# Patient Record
Sex: FEMALE | Race: WHITE | NOT HISPANIC OR LATINO | URBAN - METROPOLITAN AREA
[De-identification: names, ages, dates, MRNs, and addresses within clinical notes are randomized per-mention and may not be internally consistent; named-entity substitution may affect disease eponyms.]

---

## 2019-07-14 ENCOUNTER — EMERGENCY (EMERGENCY)
Facility: HOSPITAL | Age: 39
LOS: 0 days | Discharge: HOME | End: 2019-07-14
Attending: EMERGENCY MEDICINE | Admitting: EMERGENCY MEDICINE
Payer: COMMERCIAL

## 2019-07-14 VITALS
RESPIRATION RATE: 18 BRPM | TEMPERATURE: 98 F | HEART RATE: 80 BPM | SYSTOLIC BLOOD PRESSURE: 103 MMHG | DIASTOLIC BLOOD PRESSURE: 64 MMHG | OXYGEN SATURATION: 100 %

## 2019-07-14 VITALS
RESPIRATION RATE: 18 BRPM | OXYGEN SATURATION: 100 % | DIASTOLIC BLOOD PRESSURE: 56 MMHG | HEART RATE: 100 BPM | TEMPERATURE: 96 F | SYSTOLIC BLOOD PRESSURE: 114 MMHG

## 2019-07-14 DIAGNOSIS — R53.81 OTHER MALAISE: ICD-10-CM

## 2019-07-14 DIAGNOSIS — E86.0 DEHYDRATION: ICD-10-CM

## 2019-07-14 DIAGNOSIS — R11.2 NAUSEA WITH VOMITING, UNSPECIFIED: ICD-10-CM

## 2019-07-14 DIAGNOSIS — M54.9 DORSALGIA, UNSPECIFIED: ICD-10-CM

## 2019-07-14 DIAGNOSIS — R53.1 WEAKNESS: ICD-10-CM

## 2019-07-14 DIAGNOSIS — Z88.8 ALLERGY STATUS TO OTHER DRUGS, MEDICAMENTS AND BIOLOGICAL SUBSTANCES: ICD-10-CM

## 2019-07-14 LAB
ALBUMIN SERPL ELPH-MCNC: 3.8 G/DL — SIGNIFICANT CHANGE UP (ref 3.5–5.2)
ALP SERPL-CCNC: 51 U/L — SIGNIFICANT CHANGE UP (ref 30–115)
ALT FLD-CCNC: 12 U/L — SIGNIFICANT CHANGE UP (ref 0–41)
ANION GAP SERPL CALC-SCNC: 13 MMOL/L — SIGNIFICANT CHANGE UP (ref 7–14)
APPEARANCE UR: CLEAR — SIGNIFICANT CHANGE UP
AST SERPL-CCNC: 20 U/L — SIGNIFICANT CHANGE UP (ref 0–41)
BACTERIA # UR AUTO: ABNORMAL
BASOPHILS # BLD AUTO: 0.05 K/UL — SIGNIFICANT CHANGE UP (ref 0–0.2)
BASOPHILS NFR BLD AUTO: 0.5 % — SIGNIFICANT CHANGE UP (ref 0–1)
BILIRUB SERPL-MCNC: 0.7 MG/DL — SIGNIFICANT CHANGE UP (ref 0.2–1.2)
BILIRUB UR-MCNC: NEGATIVE — SIGNIFICANT CHANGE UP
BUN SERPL-MCNC: 10 MG/DL — SIGNIFICANT CHANGE UP (ref 10–20)
CALCIUM SERPL-MCNC: 9.8 MG/DL — SIGNIFICANT CHANGE UP (ref 8.5–10.1)
CHLORIDE SERPL-SCNC: 89 MMOL/L — LOW (ref 98–110)
CO2 SERPL-SCNC: 24 MMOL/L — SIGNIFICANT CHANGE UP (ref 17–32)
COLOR SPEC: YELLOW — SIGNIFICANT CHANGE UP
CREAT SERPL-MCNC: 0.7 MG/DL — SIGNIFICANT CHANGE UP (ref 0.7–1.5)
DIFF PNL FLD: NEGATIVE — SIGNIFICANT CHANGE UP
EOSINOPHIL # BLD AUTO: 0.26 K/UL — SIGNIFICANT CHANGE UP (ref 0–0.7)
EOSINOPHIL NFR BLD AUTO: 2.8 % — SIGNIFICANT CHANGE UP (ref 0–8)
EPI CELLS # UR: 4 /HPF — SIGNIFICANT CHANGE UP (ref 0–5)
GLUCOSE SERPL-MCNC: 89 MG/DL — SIGNIFICANT CHANGE UP (ref 70–99)
GLUCOSE UR QL: NEGATIVE — SIGNIFICANT CHANGE UP
HCG SERPL QL: NEGATIVE — SIGNIFICANT CHANGE UP
HCT VFR BLD CALC: 43.1 % — SIGNIFICANT CHANGE UP (ref 37–47)
HGB BLD-MCNC: 14.2 G/DL — SIGNIFICANT CHANGE UP (ref 12–16)
HYALINE CASTS # UR AUTO: NEGATIVE /LPF — SIGNIFICANT CHANGE UP (ref 0–7)
IMM GRANULOCYTES NFR BLD AUTO: 0.2 % — SIGNIFICANT CHANGE UP (ref 0.1–0.3)
KETONES UR-MCNC: NEGATIVE — SIGNIFICANT CHANGE UP
LACTATE SERPL-SCNC: 2 MMOL/L — SIGNIFICANT CHANGE UP (ref 0.5–2.2)
LEUKOCYTE ESTERASE UR-ACNC: ABNORMAL
LIDOCAIN IGE QN: 36 U/L — SIGNIFICANT CHANGE UP (ref 7–60)
LYMPHOCYTES # BLD AUTO: 3.79 K/UL — HIGH (ref 1.2–3.4)
LYMPHOCYTES # BLD AUTO: 40.2 % — SIGNIFICANT CHANGE UP (ref 20.5–51.1)
MAGNESIUM SERPL-MCNC: 1.6 MG/DL — LOW (ref 1.8–2.4)
MCHC RBC-ENTMCNC: 27.4 PG — SIGNIFICANT CHANGE UP (ref 27–31)
MCHC RBC-ENTMCNC: 32.9 G/DL — SIGNIFICANT CHANGE UP (ref 32–37)
MCV RBC AUTO: 83.2 FL — SIGNIFICANT CHANGE UP (ref 81–99)
MONOCYTES # BLD AUTO: 1.05 K/UL — HIGH (ref 0.1–0.6)
MONOCYTES NFR BLD AUTO: 11.1 % — HIGH (ref 1.7–9.3)
NEUTROPHILS # BLD AUTO: 4.26 K/UL — SIGNIFICANT CHANGE UP (ref 1.4–6.5)
NEUTROPHILS NFR BLD AUTO: 45.2 % — SIGNIFICANT CHANGE UP (ref 42.2–75.2)
NITRITE UR-MCNC: NEGATIVE — SIGNIFICANT CHANGE UP
NRBC # BLD: 0 /100 WBCS — SIGNIFICANT CHANGE UP (ref 0–0)
PH UR: 6 — SIGNIFICANT CHANGE UP (ref 5–8)
PLATELET # BLD AUTO: 318 K/UL — SIGNIFICANT CHANGE UP (ref 130–400)
POTASSIUM SERPL-MCNC: 4.9 MMOL/L — SIGNIFICANT CHANGE UP (ref 3.5–5)
POTASSIUM SERPL-SCNC: 4.9 MMOL/L — SIGNIFICANT CHANGE UP (ref 3.5–5)
PROT SERPL-MCNC: 6.8 G/DL — SIGNIFICANT CHANGE UP (ref 6–8)
PROT UR-MCNC: NEGATIVE — SIGNIFICANT CHANGE UP
RBC # BLD: 5.18 M/UL — SIGNIFICANT CHANGE UP (ref 4.2–5.4)
RBC # FLD: 12.9 % — SIGNIFICANT CHANGE UP (ref 11.5–14.5)
RBC CASTS # UR COMP ASSIST: 1 /HPF — SIGNIFICANT CHANGE UP (ref 0–4)
SODIUM SERPL-SCNC: 126 MMOL/L — LOW (ref 135–146)
SP GR SPEC: 1.01 — SIGNIFICANT CHANGE UP (ref 1.01–1.02)
UROBILINOGEN FLD QL: SIGNIFICANT CHANGE UP
WBC # BLD: 9.43 K/UL — SIGNIFICANT CHANGE UP (ref 4.8–10.8)
WBC # FLD AUTO: 9.43 K/UL — SIGNIFICANT CHANGE UP (ref 4.8–10.8)
WBC UR QL: 7 /HPF — HIGH (ref 0–5)

## 2019-07-14 PROCEDURE — 99284 EMERGENCY DEPT VISIT MOD MDM: CPT

## 2019-07-14 PROCEDURE — 93010 ELECTROCARDIOGRAM REPORT: CPT

## 2019-07-14 RX ORDER — SODIUM CHLORIDE 9 MG/ML
1000 INJECTION INTRAMUSCULAR; INTRAVENOUS; SUBCUTANEOUS ONCE
Refills: 0 | Status: COMPLETED | OUTPATIENT
Start: 2019-07-14 | End: 2019-07-14

## 2019-07-14 RX ORDER — ACETAMINOPHEN 500 MG
650 TABLET ORAL ONCE
Refills: 0 | Status: COMPLETED | OUTPATIENT
Start: 2019-07-14 | End: 2019-07-14

## 2019-07-14 RX ORDER — ONDANSETRON 8 MG/1
4 TABLET, FILM COATED ORAL ONCE
Refills: 0 | Status: COMPLETED | OUTPATIENT
Start: 2019-07-14 | End: 2019-07-14

## 2019-07-14 RX ORDER — MAGNESIUM SULFATE 500 MG/ML
2 VIAL (ML) INJECTION ONCE
Refills: 0 | Status: DISCONTINUED | OUTPATIENT
Start: 2019-07-14 | End: 2019-07-14

## 2019-07-14 RX ORDER — SODIUM CHLORIDE 9 MG/ML
1000 INJECTION, SOLUTION INTRAVENOUS ONCE
Refills: 0 | Status: COMPLETED | OUTPATIENT
Start: 2019-07-14 | End: 2019-07-14

## 2019-07-14 RX ADMIN — SODIUM CHLORIDE 1000 MILLILITER(S): 9 INJECTION INTRAMUSCULAR; INTRAVENOUS; SUBCUTANEOUS at 19:05

## 2019-07-14 RX ADMIN — SODIUM CHLORIDE 1000 MILLILITER(S): 9 INJECTION, SOLUTION INTRAVENOUS at 18:11

## 2019-07-14 RX ADMIN — SODIUM CHLORIDE 1000 MILLILITER(S): 9 INJECTION, SOLUTION INTRAVENOUS at 17:11

## 2019-07-14 RX ADMIN — SODIUM CHLORIDE 1000 MILLILITER(S): 9 INJECTION INTRAMUSCULAR; INTRAVENOUS; SUBCUTANEOUS at 20:46

## 2019-07-14 RX ADMIN — Medication 650 MILLIGRAM(S): at 20:45

## 2019-07-14 NOTE — ED ADULT NURSE REASSESSMENT NOTE - NS ED NURSE REASSESS COMMENT FT1
patient observed resting in bed. c/o mild headache. VS reassessed, VS WNL. plan of care reviewed with patient, awaiting disposition.

## 2019-07-14 NOTE — ED PROVIDER NOTE - CARE PLAN
Principal Discharge DX:	Nausea & vomiting  Secondary Diagnosis:	Malaise  Secondary Diagnosis:	Back pain

## 2019-07-14 NOTE — ED PROVIDER NOTE - NSFOLLOWUPINSTRUCTIONS_ED_ALL_ED_FT
Please follow up with your PMD and gastroenterologist as outpatient.    Back Pain    Back pain is very common in adults. The cause of back pain is rarely dangerous and the pain often gets better over time. The cause of your back pain may not be known and may include strain of muscles or ligaments, degeneration of the spinal disks, or arthritis. Occasionally the pain may radiate down your leg(s). Over-the-counter medicines to reduce pain and inflammation are often the most helpful. Stretching and remaining active frequently helps the healing process.     SEEK IMMEDIATE MEDICAL CARE IF YOU HAVE ANY OF THE FOLLOWING SYMPTOMS: bowel or bladder control problems, unusual weakness or numbness in your arms or legs, nausea or vomiting, abdominal pain, fever, dizziness/lightheadedness.    Nausea / Vomiting    Nausea is the feeling that you have to vomit. As nausea gets worse, it can lead to vomiting. Vomiting puts you at an increased risk for dehydration. Older adults and people with other diseases or a weak immune system are at higher risk for dehydration. Drink clear fluids in small but frequent amounts as tolerated. Eat bland, easy-to-digest foods in small amounts as tolerated.    SEEK IMMEDIATE MEDICAL CARE IF YOU HAVE ANY OF THE FOLLOWING SYMPTOMS: fever, inability to keep sufficient fluids down, black or bloody vomitus, black or bloody stools, lightheadedness/dizziness, chest pain, severe headache, rash, shortness of breath, cold or clammy skin, confusion, pain with urination, or any signs of dehydration.

## 2019-07-14 NOTE — ED PROVIDER NOTE - CLINICAL SUMMARY MEDICAL DECISION MAKING FREE TEXT BOX
patient evaluated for non specific back pain, weakness, lab work nml, after ivf patient feels better.

## 2019-07-14 NOTE — ED ADULT TRIAGE NOTE - CHIEF COMPLAINT QUOTE
Patient has pain in lower back and legs x 2 weeks. Since Thursday having "loose stool". Patient states symptoms got worse over the past week since Wednesday when she started her menstrual cycle

## 2019-07-14 NOTE — ED ADULT NURSE NOTE - OBJECTIVE STATEMENT
Patient c/o leg pain and cramping after walking up 1 flight of stairs starting last week. Patient also feels lethargic and had 1 episode of NBNB vomiting on Thursday. Patient also having loose stool NBNB. On assessment no obvious signs of distress noted.

## 2019-07-14 NOTE — ED PROVIDER NOTE - NS ED ROS FT
Constitutional:  (+) malaise (-) fever (-) chills   Eyes: (-) visual changes   ENMT: (-) nasal or chest congestion (-) runny nose (-) sore throat (-) hoarseness (-) neck pain (-) neck stiffness  Cardiac: (-) chest pain (-) syncope  Respiratory: (-) cough (-) SOB  GI: (-) abdominal pain (-) nausea (-) vomiting (-) diarrhea (-) abdominal pain (-) hematochezia (-) changes in appetite (-) hx of nephrolithiasis  : (-) dysuria (-) increased frequency  (-) hematuria   MS: (-) back pain  Neuro: (+) weakness (-) headache (-) dizziness (-) numbness/tingling to extremities B/L   Skin: (-) rash   Except as documented in the HPI, all other systems are negative.

## 2019-07-14 NOTE — ED PROVIDER NOTE - OBJECTIVE STATEMENT
39 yo vegan female with PMH of migraines presents to the ED with multiple complaints. Patient c/o weakness and feeling dehydrated x 2 weeks.  Patient states that she started her menstrual cycle last week and had couple episodes of NBNB vomiting and loose stools last Thursday that passed on Friday. Patient unsure if symptoms are related to her period or due to dehydration.  Denies fever, chills, chest pain, SOB, nausea, dysuria, hematuria, abnormal vaginal bleeding or discharge, rash, syncope, bloody stools, hx of anemia, or recent travel.

## 2019-07-14 NOTE — ED PROVIDER NOTE - ATTENDING CONTRIBUTION TO CARE
non specific symtpoms of weakness, irregular bowel movements and feeling dehydrated for 2 days that she relates to her menstrual period. no fevers, no body aches, no chest pain, abd pain. on exam well appearing mmm, lungs and heart nml, abd soft, no cva tendnerss, plan is to check basic labs and ivf.

## 2019-07-14 NOTE — ED PROVIDER NOTE - PHYSICAL EXAMINATION
GENERAL: Well-nourished, Well-developed. NAD.  Eyes: PERRLA, EOMI. Pink conjunctiva B/L. No asymmetry. No nystagmus. No conjunctival injection. Non-icteric sclera.  ENMT: MMM. No pharyngeal erythema or exudates. Uvula midline. No oral lesions.  Neck: FROM  CVS:  Normal S1,S2. No murmurs appreciated on auscultation   RESP: No use of accessory muscles. Chest rise symmetrical with good expansion. Lungs clear to auscultation B/L. No wheezing, rales, or rhonchi auscultated.  GI: Normal auscultation of bowel sounds in all 4 quadrants. Soft, Nontender, Nondistended. No guarding. No CVAT B/L.  MSK: No visible signs of trauma such as ecchymosis, erythema, or swelling. FROM of upper and lower extremities B/L.   Skin: Warm, Dry. No rashes or lesions. Good cap refill < 2 sec B/L.  EXT: Radial and pedal pulses present B/L. No calf tenderness or swelling B/L.   Neuro: AA&O x 3. Mini mental status exam Intact. CNs II-XII grossly intact. Speaking in full sentences. No slurring of speech. No facial droop. No tremors. Sensation grossly intact. Strength 5/5 B/L. Gait within normal limits. Normal Finger to nose exam.   Psych:  Cooperative. Anxious

## 2019-07-14 NOTE — ED PROVIDER NOTE - CARE PROVIDER_API CALL
YOUR PMD,   Phone: (   )    -  Fax: (   )    -  Follow Up Time:     Sumeet Johnson)  Gastroenterology; Internal Medicine  [address] [place] [address]  Phone: [phone]  Fax: [phone]  Follow Up Time:

## 2019-07-15 LAB
CULTURE RESULTS: SIGNIFICANT CHANGE UP
SPECIMEN SOURCE: SIGNIFICANT CHANGE UP

## 2019-12-23 ENCOUNTER — EMERGENCY (EMERGENCY)
Facility: HOSPITAL | Age: 39
LOS: 0 days | Discharge: HOME | End: 2019-12-23
Attending: EMERGENCY MEDICINE | Admitting: EMERGENCY MEDICINE
Payer: COMMERCIAL

## 2019-12-23 VITALS
WEIGHT: 147.93 LBS | SYSTOLIC BLOOD PRESSURE: 98 MMHG | DIASTOLIC BLOOD PRESSURE: 65 MMHG | OXYGEN SATURATION: 99 % | TEMPERATURE: 98 F | RESPIRATION RATE: 18 BRPM | HEART RATE: 109 BPM

## 2019-12-23 VITALS — HEART RATE: 96 BPM | SYSTOLIC BLOOD PRESSURE: 86 MMHG | DIASTOLIC BLOOD PRESSURE: 56 MMHG

## 2019-12-23 DIAGNOSIS — R19.5 OTHER FECAL ABNORMALITIES: ICD-10-CM

## 2019-12-23 DIAGNOSIS — Z88.8 ALLERGY STATUS TO OTHER DRUGS, MEDICAMENTS AND BIOLOGICAL SUBSTANCES STATUS: ICD-10-CM

## 2019-12-23 DIAGNOSIS — R10.9 UNSPECIFIED ABDOMINAL PAIN: ICD-10-CM

## 2019-12-23 DIAGNOSIS — R11.10 VOMITING, UNSPECIFIED: ICD-10-CM

## 2019-12-23 PROBLEM — Z78.9 OTHER SPECIFIED HEALTH STATUS: Chronic | Status: ACTIVE | Noted: 2019-07-22

## 2019-12-23 LAB
ALBUMIN SERPL ELPH-MCNC: 4.1 G/DL — SIGNIFICANT CHANGE UP (ref 3.5–5.2)
ALP SERPL-CCNC: 49 U/L — SIGNIFICANT CHANGE UP (ref 30–115)
ALT FLD-CCNC: 12 U/L — SIGNIFICANT CHANGE UP (ref 0–41)
ANION GAP SERPL CALC-SCNC: 15 MMOL/L — HIGH (ref 7–14)
APPEARANCE UR: CLEAR — SIGNIFICANT CHANGE UP
AST SERPL-CCNC: 19 U/L — SIGNIFICANT CHANGE UP (ref 0–41)
BASOPHILS # BLD AUTO: 0.08 K/UL — SIGNIFICANT CHANGE UP (ref 0–0.2)
BASOPHILS NFR BLD AUTO: 0.7 % — SIGNIFICANT CHANGE UP (ref 0–1)
BILIRUB DIRECT SERPL-MCNC: 0.2 MG/DL — SIGNIFICANT CHANGE UP (ref 0–0.2)
BILIRUB INDIRECT FLD-MCNC: 0.7 MG/DL — SIGNIFICANT CHANGE UP (ref 0.2–1.2)
BILIRUB SERPL-MCNC: 0.9 MG/DL — SIGNIFICANT CHANGE UP (ref 0.2–1.2)
BILIRUB UR-MCNC: NEGATIVE — SIGNIFICANT CHANGE UP
BUN SERPL-MCNC: 15 MG/DL — SIGNIFICANT CHANGE UP (ref 10–20)
CALCIUM SERPL-MCNC: 9.9 MG/DL — SIGNIFICANT CHANGE UP (ref 8.5–10.1)
CHLORIDE SERPL-SCNC: 92 MMOL/L — LOW (ref 98–110)
CO2 SERPL-SCNC: 21 MMOL/L — SIGNIFICANT CHANGE UP (ref 17–32)
COLOR SPEC: YELLOW — SIGNIFICANT CHANGE UP
CREAT SERPL-MCNC: 0.8 MG/DL — SIGNIFICANT CHANGE UP (ref 0.7–1.5)
DIFF PNL FLD: NEGATIVE — SIGNIFICANT CHANGE UP
EOSINOPHIL # BLD AUTO: 0.42 K/UL — SIGNIFICANT CHANGE UP (ref 0–0.7)
EOSINOPHIL NFR BLD AUTO: 3.5 % — SIGNIFICANT CHANGE UP (ref 0–8)
GLUCOSE SERPL-MCNC: 93 MG/DL — SIGNIFICANT CHANGE UP (ref 70–99)
GLUCOSE UR QL: NEGATIVE — SIGNIFICANT CHANGE UP
HCT VFR BLD CALC: 43 % — SIGNIFICANT CHANGE UP (ref 37–47)
HGB BLD-MCNC: 13.9 G/DL — SIGNIFICANT CHANGE UP (ref 12–16)
IMM GRANULOCYTES NFR BLD AUTO: 0.2 % — SIGNIFICANT CHANGE UP (ref 0.1–0.3)
KETONES UR-MCNC: ABNORMAL
LACTATE SERPL-SCNC: 0.7 MMOL/L — SIGNIFICANT CHANGE UP (ref 0.7–2)
LEUKOCYTE ESTERASE UR-ACNC: NEGATIVE — SIGNIFICANT CHANGE UP
LIDOCAIN IGE QN: 40 U/L — SIGNIFICANT CHANGE UP (ref 7–60)
LYMPHOCYTES # BLD AUTO: 37.5 % — SIGNIFICANT CHANGE UP (ref 20.5–51.1)
LYMPHOCYTES # BLD AUTO: 4.5 K/UL — HIGH (ref 1.2–3.4)
MCHC RBC-ENTMCNC: 27 PG — SIGNIFICANT CHANGE UP (ref 27–31)
MCHC RBC-ENTMCNC: 32.3 G/DL — SIGNIFICANT CHANGE UP (ref 32–37)
MCV RBC AUTO: 83.5 FL — SIGNIFICANT CHANGE UP (ref 81–99)
MONOCYTES # BLD AUTO: 1.19 K/UL — HIGH (ref 0.1–0.6)
MONOCYTES NFR BLD AUTO: 9.9 % — HIGH (ref 1.7–9.3)
NEUTROPHILS # BLD AUTO: 5.79 K/UL — SIGNIFICANT CHANGE UP (ref 1.4–6.5)
NEUTROPHILS NFR BLD AUTO: 48.2 % — SIGNIFICANT CHANGE UP (ref 42.2–75.2)
NITRITE UR-MCNC: NEGATIVE — SIGNIFICANT CHANGE UP
NRBC # BLD: 0 /100 WBCS — SIGNIFICANT CHANGE UP (ref 0–0)
PH UR: 5.5 — SIGNIFICANT CHANGE UP (ref 5–8)
PLATELET # BLD AUTO: 308 K/UL — SIGNIFICANT CHANGE UP (ref 130–400)
POTASSIUM SERPL-MCNC: 4.6 MMOL/L — SIGNIFICANT CHANGE UP (ref 3.5–5)
POTASSIUM SERPL-SCNC: 4.6 MMOL/L — SIGNIFICANT CHANGE UP (ref 3.5–5)
PROT SERPL-MCNC: 6.8 G/DL — SIGNIFICANT CHANGE UP (ref 6–8)
PROT UR-MCNC: SIGNIFICANT CHANGE UP
RBC # BLD: 5.15 M/UL — SIGNIFICANT CHANGE UP (ref 4.2–5.4)
RBC # FLD: 13.6 % — SIGNIFICANT CHANGE UP (ref 11.5–14.5)
SODIUM SERPL-SCNC: 128 MMOL/L — LOW (ref 135–146)
SP GR SPEC: 1.02 — SIGNIFICANT CHANGE UP (ref 1.01–1.02)
UROBILINOGEN FLD QL: SIGNIFICANT CHANGE UP
WBC # BLD: 12.01 K/UL — HIGH (ref 4.8–10.8)
WBC # FLD AUTO: 12.01 K/UL — HIGH (ref 4.8–10.8)

## 2019-12-23 PROCEDURE — 99284 EMERGENCY DEPT VISIT MOD MDM: CPT

## 2019-12-23 NOTE — ED PROVIDER NOTE - CARE PROVIDER_API CALL
Sumeet Johnson)  Gastroenterology; Internal Medicine  [address] [place] [address]  Phone: [phone]  Fax: [phone]  Follow Up Time: 1-3 Days

## 2019-12-23 NOTE — ED ADULT NURSE NOTE - OBJECTIVE STATEMENT
pt 39 y/o female presents to Ed c/o light yellow stool as per pt she always has yellow stool but today was lighter, pt states she is nauseous but more so because of being nervous

## 2019-12-23 NOTE — ED PROVIDER NOTE - PHYSICAL EXAMINATION
Physical Exam    Vital Signs: I have reviewed the initial vital signs  Constitutional: well-nourished, appears stated age, no acute distress  EENT: Conjunctiva pink, Sclera clear, PERRLA, EOMI. Mucous membranes moist, no exudates or lesions noted, uvula midline.  Cardiovascular: S1 and S2 present, regular rate, regular rhythm  Respiratory: unlabored respiratory effort, clear to auscultation bilaterally no wheezing, rales or rhonchi  Gastrointestinal: soft, non-tender abdomen. No guarding or rebound tenderness.   Musculoskeletal: supple nontender neck, no midline tenderness, no joint pain  Integumentary: warm, dry, no rash  Psychiatric: appropriate mood, appropriate affect

## 2019-12-23 NOTE — ED PROVIDER NOTE - OBJECTIVE STATEMENT
39 year old female no past medical history presenting with pale stools. Patient states she had pale stools, yellow in color this morning. She states she had some honey and then vomited once NBNB. No medical problems, no hx of abd sx. Denies fevers, chills, headache, dizziness, chest pain, cough, shortness of breath, abdominal pain, nausea, diarrhea, dysuria. No recent travel.

## 2019-12-23 NOTE — ED ADULT TRIAGE NOTE - CHIEF COMPLAINT QUOTE
pt c.o abdominal pain with light yellow stool with excessive gas pt c.o abdominal pain with light yellow stool with excessive gas, pt states is very anxious about being here

## 2019-12-23 NOTE — ED PROVIDER NOTE - PATIENT PORTAL LINK FT
You can access the FollowMyHealth Patient Portal offered by [facility] by registering at the following website: [email or web address]. By joining [employer]’s FollowMyHealth portal, you will also be able to view your health information using other applications (apps) compatible with our system.

## 2019-12-23 NOTE — ED ADULT NURSE NOTE - CHIEF COMPLAINT QUOTE
pt c.o abdominal pain with light yellow stool with excessive gas, pt states is very anxious about being here

## 2019-12-23 NOTE — ED PROVIDER NOTE - NS ED ROS FT
Review of Systems         Constitutional: (-) fever (-) chills (-) weakness       EENT: (-) sore throat (-) congestion       Cardiovascular: (-) chest pain (-) syncope       Respiratory: (-) cough, (-) shortness of breath       Gastrointestinal: (-) abdominal pain (-) vomiting (-) diarrhea (-) nausea (-) constipation       Genitourinary: (-) dysuria       Musculoskeletal: (-) neck pain (-) back pain (-) joint pain       Integumentary: (-) rash       Neurological: (-) headache (-) altered mental status (-) dizziness       Psych: (-) psych history

## 2019-12-23 NOTE — ED PROVIDER NOTE - ATTENDING CONTRIBUTION TO CARE
38 y/o female denies sig PMH / PSH, takes OTC iron and Mg supplements, now presents with abnormal stool color x several weeks where stool was pale, then noticed today that stool turned yellow, sx are persistent, denies modifying factors, + abdominal bloating / gas, not recently hospitalized, not institutionalized or in day program, denies blood in stool, black stool, unintentional weight loss, abdominal pain, fever, headache, myalgias, n/v, recent antibiotic use, recent travel or sick contacts, anorexia, urinary sx, vaginal d/c or other associated complaints at present.    VSS, awake, alert, non-toxic appearing, lying comfortably in stretcher, in NAD, ears clear, no scleral icterus, oropharynx clear, mmm, no JVD or bruit, no jaundice, skin rash or lesions, chest CTAB, non-labored breathing, no w/r/r, +S1/S2, RRR, no m/r/g, abdomen soft, NT, ND, +BS, no scars, hernias or distention, no pulsatile masses or bruits appreciated, no CVA tenderness, no peripheral edema or deformities, alert, clear speech and steady gait.    Labs WNL, VSS, pt is well appearing, rec outpt f/u for further evaluation and management. The patient was given detailed return precautions and advised to return to the emergency department if any new symptoms developed, symptoms worsened or for any concerns. The patient was offered the opportunity to ask questions and verbalized that they understand the diagnosis and discharge instructions.

## 2019-12-24 LAB
CULTURE RESULTS: SIGNIFICANT CHANGE UP
SPECIMEN SOURCE: SIGNIFICANT CHANGE UP

## 2020-04-21 PROBLEM — Z00.00 ENCOUNTER FOR PREVENTIVE HEALTH EXAMINATION: Status: ACTIVE | Noted: 2020-04-21

## 2020-07-28 ENCOUNTER — APPOINTMENT (OUTPATIENT)
Dept: OBGYN | Facility: CLINIC | Age: 40
End: 2020-07-28

## 2021-01-04 ENCOUNTER — EMERGENCY (EMERGENCY)
Facility: HOSPITAL | Age: 41
LOS: 0 days | Discharge: LEFT BEFORE DISCHARGE | End: 2021-01-04
Attending: EMERGENCY MEDICINE | Admitting: EMERGENCY MEDICINE
Payer: COMMERCIAL

## 2021-01-04 VITALS
RESPIRATION RATE: 17 BRPM | DIASTOLIC BLOOD PRESSURE: 52 MMHG | SYSTOLIC BLOOD PRESSURE: 96 MMHG | HEART RATE: 100 BPM | OXYGEN SATURATION: 100 %

## 2021-01-04 VITALS
DIASTOLIC BLOOD PRESSURE: 58 MMHG | WEIGHT: 125 LBS | SYSTOLIC BLOOD PRESSURE: 85 MMHG | RESPIRATION RATE: 18 BRPM | HEART RATE: 120 BPM | OXYGEN SATURATION: 100 %

## 2021-01-04 DIAGNOSIS — Z88.8 ALLERGY STATUS TO OTHER DRUGS, MEDICAMENTS AND BIOLOGICAL SUBSTANCES STATUS: ICD-10-CM

## 2021-01-04 DIAGNOSIS — R51.9 HEADACHE, UNSPECIFIED: ICD-10-CM

## 2021-01-04 DIAGNOSIS — I95.9 HYPOTENSION, UNSPECIFIED: ICD-10-CM

## 2021-01-04 LAB
ALBUMIN SERPL ELPH-MCNC: 3.7 G/DL — SIGNIFICANT CHANGE UP (ref 3.5–5.2)
ALP SERPL-CCNC: 60 U/L — SIGNIFICANT CHANGE UP (ref 30–115)
ALT FLD-CCNC: 9 U/L — SIGNIFICANT CHANGE UP (ref 0–41)
ANION GAP SERPL CALC-SCNC: 12 MMOL/L — SIGNIFICANT CHANGE UP (ref 7–14)
AST SERPL-CCNC: 33 U/L — SIGNIFICANT CHANGE UP (ref 0–41)
BASOPHILS # BLD AUTO: 0.05 K/UL — SIGNIFICANT CHANGE UP (ref 0–0.2)
BASOPHILS NFR BLD AUTO: 0.4 % — SIGNIFICANT CHANGE UP (ref 0–1)
BILIRUB SERPL-MCNC: 0.6 MG/DL — SIGNIFICANT CHANGE UP (ref 0.2–1.2)
BUN SERPL-MCNC: 18 MG/DL — SIGNIFICANT CHANGE UP (ref 10–20)
CALCIUM SERPL-MCNC: 9.5 MG/DL — SIGNIFICANT CHANGE UP (ref 8.5–10.1)
CHLORIDE SERPL-SCNC: 100 MMOL/L — SIGNIFICANT CHANGE UP (ref 98–110)
CO2 SERPL-SCNC: 17 MMOL/L — SIGNIFICANT CHANGE UP (ref 17–32)
CREAT SERPL-MCNC: 0.9 MG/DL — SIGNIFICANT CHANGE UP (ref 0.7–1.5)
EOSINOPHIL # BLD AUTO: 0.47 K/UL — SIGNIFICANT CHANGE UP (ref 0–0.7)
EOSINOPHIL NFR BLD AUTO: 4 % — SIGNIFICANT CHANGE UP (ref 0–8)
GLUCOSE SERPL-MCNC: 84 MG/DL — SIGNIFICANT CHANGE UP (ref 70–99)
HCG SERPL QL: NEGATIVE — SIGNIFICANT CHANGE UP
HCT VFR BLD CALC: 36.5 % — LOW (ref 37–47)
HGB BLD-MCNC: 11.6 G/DL — LOW (ref 12–16)
IMM GRANULOCYTES NFR BLD AUTO: 0.3 % — SIGNIFICANT CHANGE UP (ref 0.1–0.3)
LYMPHOCYTES # BLD AUTO: 48.7 % — SIGNIFICANT CHANGE UP (ref 20.5–51.1)
LYMPHOCYTES # BLD AUTO: 5.68 K/UL — HIGH (ref 1.2–3.4)
MAGNESIUM SERPL-MCNC: 1.8 MG/DL — SIGNIFICANT CHANGE UP (ref 1.8–2.4)
MCHC RBC-ENTMCNC: 27.4 PG — SIGNIFICANT CHANGE UP (ref 27–31)
MCHC RBC-ENTMCNC: 31.8 G/DL — LOW (ref 32–37)
MCV RBC AUTO: 86.1 FL — SIGNIFICANT CHANGE UP (ref 81–99)
MONOCYTES # BLD AUTO: 1.07 K/UL — HIGH (ref 0.1–0.6)
MONOCYTES NFR BLD AUTO: 9.2 % — SIGNIFICANT CHANGE UP (ref 1.7–9.3)
NEUTROPHILS # BLD AUTO: 4.36 K/UL — SIGNIFICANT CHANGE UP (ref 1.4–6.5)
NEUTROPHILS NFR BLD AUTO: 37.4 % — LOW (ref 42.2–75.2)
NRBC # BLD: 0 /100 WBCS — SIGNIFICANT CHANGE UP (ref 0–0)
PLATELET # BLD AUTO: 359 K/UL — SIGNIFICANT CHANGE UP (ref 130–400)
POTASSIUM SERPL-MCNC: 6.4 MMOL/L — CRITICAL HIGH (ref 3.5–5)
POTASSIUM SERPL-SCNC: 6.4 MMOL/L — CRITICAL HIGH (ref 3.5–5)
PROT SERPL-MCNC: 6.6 G/DL — SIGNIFICANT CHANGE UP (ref 6–8)
RBC # BLD: 4.24 M/UL — SIGNIFICANT CHANGE UP (ref 4.2–5.4)
RBC # FLD: 14.3 % — SIGNIFICANT CHANGE UP (ref 11.5–14.5)
SODIUM SERPL-SCNC: 129 MMOL/L — LOW (ref 135–146)
WBC # BLD: 11.66 K/UL — HIGH (ref 4.8–10.8)
WBC # FLD AUTO: 11.66 K/UL — HIGH (ref 4.8–10.8)

## 2021-01-04 PROCEDURE — 99284 EMERGENCY DEPT VISIT MOD MDM: CPT

## 2021-01-04 PROCEDURE — 93010 ELECTROCARDIOGRAM REPORT: CPT

## 2021-01-04 RX ORDER — SODIUM CHLORIDE 9 MG/ML
1000 INJECTION, SOLUTION INTRAVENOUS ONCE
Refills: 0 | Status: COMPLETED | OUTPATIENT
Start: 2021-01-04 | End: 2021-01-04

## 2021-01-04 RX ADMIN — SODIUM CHLORIDE 1000 MILLILITER(S): 9 INJECTION, SOLUTION INTRAVENOUS at 15:41

## 2021-01-04 NOTE — ED PROVIDER NOTE - CLINICAL SUMMARY MEDICAL DECISION MAKING FREE TEXT BOX
39 Y/O F H/O MIGRAINES C/O HEADACHE X 2 DAYS. HEADACHE RESOLVED ON PRESENTATION. NEURO EXAM NONFOCAL. PT HYPOTENSIVE IN TRIAGE. RESOLVED WITH IVFS. EKG REVIEWED. PT SIGNED OUT AMA PRIOR TO LAB RESULTS AND CT HEAD BEING PERFORMED. RISKS EXPLAINED AND PT VERBALIZED UNDERSTANDING.

## 2021-01-04 NOTE — ED PROVIDER NOTE - PATIENT PORTAL LINK FT
You can access the FollowMyHealth Patient Portal offered by [facility] by registering at the following website: [email or web address]. By joining [facility]’s FollowMyHealth portal, you will also be able to view your health information using other applications (apps) compatible with our system.

## 2021-01-04 NOTE — ED PROVIDER NOTE - NS ED ROS FT
General: No fever, chills, or weakness.  Eyes:  No visual changes, eye pain or discharge.  ENMT:  No hearing changes, pain, no sore throat or runny nose, no difficulty swallowing  Cardiac:  No chest pain, SOB or edema. No chest pain with exertion.  Respiratory:  No cough or respiratory distress. No hemoptysis. No history of asthma or RAD.  GI:  No nausea, vomiting, diarrhea or abdominal pain.  :  No dysuria, frequency or burning.  MS: Cold sensation in hands and feet. No myalgia, muscle weakness, joint pain or back pain.  Neuro:  HA. No LOC. No change in ambulation. No dizziness.  Skin:  No skin rash.

## 2021-01-04 NOTE — ED PROVIDER NOTE - PROGRESS NOTE DETAILS
PT REQUESTING D/C. PT UNWILLING TO WAIT FOR LAB RESULTS OR CT HEAD. RISKS OF LEAVING D/W PT. PT VERBALIZED UNDERSTANDING. WILL SIGN OUT AMA.

## 2021-01-04 NOTE — ED PROVIDER NOTE - TRANSFER CONDITION
[facility] transfer to [phone].    ----- Message from Mona Fails, DO sent at 8/4/2017 12:05 AM CDT -----  CBC shows no anemia, CMP shows your kidney function and liver function are normal, Lipids for your cholesterol panel are good and TSH for your thyroid hor
Patient verbalized understanding of VS message.
Stable

## 2021-01-04 NOTE — ED PROVIDER NOTE - NSFOLLOWUPCLINICS_GEN_ALL_ED_FT
[facility]  Medicine  [address], [place]   Phone: [phone]  Fax:   Follow Up Time: 1-3 Days

## 2021-01-04 NOTE — ED PROVIDER NOTE - ATTENDING CONTRIBUTION TO CARE
I personally evaluated the patient. I reviewed the Resident’s or Physician Assistant’s note (as assigned above), and agree with the findings and plan except as documented in my note.   41 Y/O F H/O MIGRAINE HAS, C/O 2 DAYS OF HEADACHE, TYPICALOF HER USUAL MIGRAINES. + PRECEDING AURA. PT REPORTS "ITS GONE NOW BUT I WANTED TO BE CHECKED OUT." N COMPLAINTS CURRENTLY. NO RECENT ILLNESS, FEVER, CHILLS. COUGH. NO CP, SOB. NO ABD PAIN. PT NOTED TO BE HYPOTENSIVE IN TRIAGE AND BROUGHT TO CRITICAL CARE. VITALS NOTED. . ALERT OX3 NAD. NCAT PERRL EOMI. CN 2-12 INTACT NORMAL SPEECH. NO FACIAL DROOP. 5/5 MOTOR STRENGTH B/L UPPER AND B/L LOWER EXT. NO PRONATOR DRIFT. NORMAL FINGER TO NOSE B/L. NO SENSORY DEFICIT. NECK SUPPLE. NO MENINGISMUS. RRR. LUNGS CLEAR B/L. ABD- SOFT NONTENDER. BACK NONTENDER. 2+ RADIAL AND PEDAL PULSES B/L.

## 2021-01-04 NOTE — ED PROVIDER NOTE - OBJECTIVE STATEMENT
40f h/o anxiety p/w HA, cold sensation in hands. HA is diffuse, throbbing, 5/10, non-radiating, not a/w any activity or time of day. Denies fever/chills, cough, sore throat, cp, sob, abd pain, n/v/d. Ambulating w/o difficulty. No weakness in hands or legs.

## 2021-01-04 NOTE — ED ADULT NURSE NOTE - CHPI ED NUR SYMPTOMS NEG
no blurred vision/no change in level of consciousness/no confusion/no fever/no loss of consciousness/no numbness/no vomiting/no weakness

## 2021-01-04 NOTE — ED ADULT NURSE REASSESSMENT NOTE - NS ED NURSE REASSESS COMMENT FT1
patient stating " I need a few minutes im frozen. can you skip me and call someone else". patient educated on the importance of being triaged at this time, patient verbalizes understanding and continue to refuse triage at this time.

## 2021-01-04 NOTE — ED PROVIDER NOTE - PHYSICAL EXAMINATION
Constitutional: Well developed, well nourished. NAD. Good general hygiene  Head: Atraumatic.  Eyes: PERRLA. EOMI without discomfort.   ENT: No nasal discharge. Mucous membranes moist.  Neck: Supple. Painless ROM.  Cardiovascular: Regular rhythm. Regular rate. Normal S1 and S2. No murmurs. 2+ pulses in all extremities.   Pulmonary: Normal respiratory rate and effort. Lungs clear to auscultation bilaterally. No wheezing, rales, or rhonchi. Bilateral, equal lung expansion.   Abdominal: Soft. Nondistended. Nontender. No rebound or guarding.   Extremities. Pelvis stable. No lower extremity edema. Symmetric calves.  Skin: No rashes.   Neuro: AAOx3. CN 2-12, 5/5 strength in all extremities, sensation equal and intact in all extremities, no dysmetria, no pronator drift. Ambulating in ED w/o difficulty.

## 2021-01-04 NOTE — ED ADULT NURSE NOTE - OBJECTIVE STATEMENT
pt presents for asymptomatic hypotension. pt states 3 days ago she had a migraine and this afternoon she woke up with left arm numbness after napping and leaning on her arm. pt denies any pain /numbness currently. denies any chest pain/sob. neuro exam intact. gcs-15.

## 2021-04-27 NOTE — ED ADULT NURSE NOTE - PAIN RATING/NUMBER SCALE (0-10): REST
0 5-Fu Pregnancy And Lactation Text: This medication is Pregnancy Category X and contraindicated in pregnancy and in women who may become pregnant. It is unknown if this medication is excreted in breast milk.

## 2022-01-01 ENCOUNTER — INPATIENT (INPATIENT)
Facility: HOSPITAL | Age: 42
LOS: 1 days | End: 2022-01-08
Attending: INTERNAL MEDICINE | Admitting: INTERNAL MEDICINE
Payer: MEDICAID

## 2022-01-01 VITALS
WEIGHT: 80.03 LBS | DIASTOLIC BLOOD PRESSURE: 58 MMHG | OXYGEN SATURATION: 97 % | SYSTOLIC BLOOD PRESSURE: 135 MMHG | HEART RATE: 91 BPM

## 2022-01-01 VITALS — HEART RATE: 22 BPM

## 2022-01-01 LAB
ALBUMIN SERPL ELPH-MCNC: 1.4 G/DL — LOW (ref 3.5–5.2)
ALBUMIN SERPL ELPH-MCNC: 1.4 G/DL — LOW (ref 3.5–5.2)
ALBUMIN SERPL ELPH-MCNC: 2.1 G/DL — LOW (ref 3.5–5.2)
ALBUMIN SERPL ELPH-MCNC: 2.2 G/DL — LOW (ref 3.5–5.2)
ALBUMIN SERPL ELPH-MCNC: 2.4 G/DL — LOW (ref 3.5–5.2)
ALBUMIN SERPL ELPH-MCNC: 2.4 G/DL — LOW (ref 3.5–5.2)
ALBUMIN SERPL ELPH-MCNC: 2.8 G/DL — LOW (ref 3.5–5.2)
ALP SERPL-CCNC: 34 U/L — SIGNIFICANT CHANGE UP (ref 30–115)
ALP SERPL-CCNC: 45 U/L — SIGNIFICANT CHANGE UP (ref 30–115)
ALP SERPL-CCNC: 46 U/L — SIGNIFICANT CHANGE UP (ref 30–115)
ALP SERPL-CCNC: 49 U/L — SIGNIFICANT CHANGE UP (ref 30–115)
ALP SERPL-CCNC: 51 U/L — SIGNIFICANT CHANGE UP (ref 30–115)
ALP SERPL-CCNC: 52 U/L — SIGNIFICANT CHANGE UP (ref 30–115)
ALP SERPL-CCNC: 62 U/L — SIGNIFICANT CHANGE UP (ref 30–115)
ALT FLD-CCNC: 101 U/L — HIGH (ref 0–41)
ALT FLD-CCNC: 150 U/L — HIGH (ref 0–41)
ALT FLD-CCNC: 155 U/L — HIGH (ref 0–41)
ALT FLD-CCNC: 163 U/L — HIGH (ref 0–41)
ALT FLD-CCNC: 170 U/L — HIGH (ref 0–41)
ALT FLD-CCNC: 180 U/L — HIGH (ref 0–41)
ALT FLD-CCNC: 282 U/L — HIGH (ref 0–41)
AMPHET UR-MCNC: NEGATIVE — SIGNIFICANT CHANGE UP
ANION GAP SERPL CALC-SCNC: 16 MMOL/L — HIGH (ref 7–14)
ANION GAP SERPL CALC-SCNC: 18 MMOL/L — HIGH (ref 7–14)
ANION GAP SERPL CALC-SCNC: 18 MMOL/L — HIGH (ref 7–14)
ANION GAP SERPL CALC-SCNC: 20 MMOL/L — HIGH (ref 7–14)
ANION GAP SERPL CALC-SCNC: 22 MMOL/L — HIGH (ref 7–14)
ANION GAP SERPL CALC-SCNC: 22 MMOL/L — HIGH (ref 7–14)
ANION GAP SERPL CALC-SCNC: 23 MMOL/L — HIGH (ref 7–14)
APAP SERPL-MCNC: <5 UG/ML — LOW (ref 10–30)
APPEARANCE UR: CLEAR — SIGNIFICANT CHANGE UP
APTT BLD: 47.1 SEC — HIGH (ref 27–39.2)
APTT BLD: 61.5 SEC — HIGH (ref 27–39.2)
AST SERPL-CCNC: 250 U/L — HIGH (ref 0–41)
AST SERPL-CCNC: 369 U/L — HIGH (ref 0–41)
AST SERPL-CCNC: 386 U/L — HIGH (ref 0–41)
AST SERPL-CCNC: 401 U/L — HIGH (ref 0–41)
AST SERPL-CCNC: 450 U/L — HIGH (ref 0–41)
AST SERPL-CCNC: 469 U/L — HIGH (ref 0–41)
AST SERPL-CCNC: 765 U/L — HIGH (ref 0–41)
BACTERIA # UR AUTO: ABNORMAL
BARBITURATES UR SCN-MCNC: NEGATIVE — SIGNIFICANT CHANGE UP
BASE EXCESS BLDA CALC-SCNC: -18.3 MMOL/L — LOW (ref -2–3)
BASOPHILS # BLD AUTO: 0.01 K/UL — SIGNIFICANT CHANGE UP (ref 0–0.2)
BASOPHILS # BLD AUTO: 0.03 K/UL — SIGNIFICANT CHANGE UP (ref 0–0.2)
BASOPHILS NFR BLD AUTO: 0.1 % — SIGNIFICANT CHANGE UP (ref 0–1)
BASOPHILS NFR BLD AUTO: 0.1 % — SIGNIFICANT CHANGE UP (ref 0–1)
BENZODIAZ UR-MCNC: POSITIVE
BILIRUB DIRECT SERPL-MCNC: 0.4 MG/DL — HIGH (ref 0–0.3)
BILIRUB INDIRECT FLD-MCNC: 0.2 MG/DL — SIGNIFICANT CHANGE UP (ref 0.2–1.2)
BILIRUB SERPL-MCNC: 0.3 MG/DL — SIGNIFICANT CHANGE UP (ref 0.2–1.2)
BILIRUB SERPL-MCNC: 0.6 MG/DL — SIGNIFICANT CHANGE UP (ref 0.2–1.2)
BILIRUB SERPL-MCNC: 0.6 MG/DL — SIGNIFICANT CHANGE UP (ref 0.2–1.2)
BILIRUB SERPL-MCNC: 0.7 MG/DL — SIGNIFICANT CHANGE UP (ref 0.2–1.2)
BILIRUB SERPL-MCNC: 0.8 MG/DL — SIGNIFICANT CHANGE UP (ref 0.2–1.2)
BILIRUB SERPL-MCNC: 0.9 MG/DL — SIGNIFICANT CHANGE UP (ref 0.2–1.2)
BILIRUB SERPL-MCNC: 1 MG/DL — SIGNIFICANT CHANGE UP (ref 0.2–1.2)
BILIRUB UR-MCNC: NEGATIVE — SIGNIFICANT CHANGE UP
BLD GP AB SCN SERPL QL: SIGNIFICANT CHANGE UP
BUN SERPL-MCNC: 24 MG/DL — HIGH (ref 10–20)
BUN SERPL-MCNC: 25 MG/DL — HIGH (ref 10–20)
BUN SERPL-MCNC: 26 MG/DL — HIGH (ref 10–20)
BUN SERPL-MCNC: 28 MG/DL — HIGH (ref 10–20)
BUN SERPL-MCNC: 29 MG/DL — HIGH (ref 10–20)
CALCIUM SERPL-MCNC: 4.7 MG/DL — CRITICAL LOW (ref 8.5–10.1)
CALCIUM SERPL-MCNC: 4.9 MG/DL — CRITICAL LOW (ref 8.5–10.1)
CALCIUM SERPL-MCNC: 4.9 MG/DL — CRITICAL LOW (ref 8.5–10.1)
CALCIUM SERPL-MCNC: 6 MG/DL — LOW (ref 8.5–10.1)
CALCIUM SERPL-MCNC: 6.5 MG/DL — LOW (ref 8.5–10.1)
CALCIUM SERPL-MCNC: 7.1 MG/DL — LOW (ref 8.5–10.1)
CALCIUM SERPL-MCNC: 8 MG/DL — LOW (ref 8.5–10.1)
CHLORIDE SERPL-SCNC: 100 MMOL/L — SIGNIFICANT CHANGE UP (ref 98–110)
CHLORIDE SERPL-SCNC: 100 MMOL/L — SIGNIFICANT CHANGE UP (ref 98–110)
CHLORIDE SERPL-SCNC: 102 MMOL/L — SIGNIFICANT CHANGE UP (ref 98–110)
CHLORIDE SERPL-SCNC: 97 MMOL/L — LOW (ref 98–110)
CHLORIDE SERPL-SCNC: 98 MMOL/L — SIGNIFICANT CHANGE UP (ref 98–110)
CHOLEST SERPL-MCNC: 48 MG/DL — SIGNIFICANT CHANGE UP
CK SERPL-CCNC: 9125 U/L — HIGH (ref 0–225)
CK SERPL-CCNC: >2000 U/L — HIGH (ref 0–225)
CK SERPL-CCNC: HIGH U/L (ref 0–225)
CK SERPL-CCNC: HIGH U/L (ref 0–225)
CO2 SERPL-SCNC: 10 MMOL/L — LOW (ref 17–32)
CO2 SERPL-SCNC: 17 MMOL/L — SIGNIFICANT CHANGE UP (ref 17–32)
CO2 SERPL-SCNC: 18 MMOL/L — SIGNIFICANT CHANGE UP (ref 17–32)
CO2 SERPL-SCNC: 20 MMOL/L — SIGNIFICANT CHANGE UP (ref 17–32)
CO2 SERPL-SCNC: 6 MMOL/L — CRITICAL LOW (ref 17–32)
CO2 SERPL-SCNC: 9 MMOL/L — CRITICAL LOW (ref 17–32)
CO2 SERPL-SCNC: 9 MMOL/L — CRITICAL LOW (ref 17–32)
COCAINE METAB.OTHER UR-MCNC: NEGATIVE — SIGNIFICANT CHANGE UP
COLOR SPEC: YELLOW — SIGNIFICANT CHANGE UP
COMMENT - URINE: SIGNIFICANT CHANGE UP
CORTIS AM PEAK SERPL-MCNC: 127 UG/DL — HIGH (ref 6–18.4)
CREAT SERPL-MCNC: 1.7 MG/DL — HIGH (ref 0.7–1.5)
CREAT SERPL-MCNC: 1.7 MG/DL — HIGH (ref 0.7–1.5)
CREAT SERPL-MCNC: 1.8 MG/DL — HIGH (ref 0.7–1.5)
CREAT SERPL-MCNC: 1.8 MG/DL — HIGH (ref 0.7–1.5)
CREAT SERPL-MCNC: 2.2 MG/DL — HIGH (ref 0.7–1.5)
CREAT SERPL-MCNC: 2.4 MG/DL — HIGH (ref 0.7–1.5)
CREAT SERPL-MCNC: 2.9 MG/DL — HIGH (ref 0.7–1.5)
DACRYOCYTES BLD QL SMEAR: SLIGHT — SIGNIFICANT CHANGE UP
DIFF PNL FLD: ABNORMAL
DRUG SCREEN 1, URINE RESULT: SIGNIFICANT CHANGE UP
EOSINOPHIL # BLD AUTO: 0.01 K/UL — SIGNIFICANT CHANGE UP (ref 0–0.7)
EOSINOPHIL # BLD AUTO: 0.05 K/UL — SIGNIFICANT CHANGE UP (ref 0–0.7)
EOSINOPHIL NFR BLD AUTO: 0 % — SIGNIFICANT CHANGE UP (ref 0–8)
EOSINOPHIL NFR BLD AUTO: 0.6 % — SIGNIFICANT CHANGE UP (ref 0–8)
EPI CELLS # UR: ABNORMAL /HPF
ETHANOL SERPL-MCNC: <10 MG/DL — SIGNIFICANT CHANGE UP
FOLATE SERPL-MCNC: 11.3 NG/ML — SIGNIFICANT CHANGE UP
GAS PNL BLDA: SIGNIFICANT CHANGE UP
GLUCOSE BLDC GLUCOMTR-MCNC: 103 MG/DL — HIGH (ref 70–99)
GLUCOSE BLDC GLUCOMTR-MCNC: 124 MG/DL — HIGH (ref 70–99)
GLUCOSE BLDC GLUCOMTR-MCNC: 156 MG/DL — HIGH (ref 70–99)
GLUCOSE BLDC GLUCOMTR-MCNC: 164 MG/DL — HIGH (ref 70–99)
GLUCOSE BLDC GLUCOMTR-MCNC: 166 MG/DL — HIGH (ref 70–99)
GLUCOSE BLDC GLUCOMTR-MCNC: 17 MG/DL — CRITICAL LOW (ref 70–99)
GLUCOSE BLDC GLUCOMTR-MCNC: 172 MG/DL — HIGH (ref 70–99)
GLUCOSE BLDC GLUCOMTR-MCNC: 183 MG/DL — HIGH (ref 70–99)
GLUCOSE BLDC GLUCOMTR-MCNC: 21 MG/DL — CRITICAL LOW (ref 70–99)
GLUCOSE BLDC GLUCOMTR-MCNC: 242 MG/DL — HIGH (ref 70–99)
GLUCOSE BLDC GLUCOMTR-MCNC: 28 MG/DL — CRITICAL LOW (ref 70–99)
GLUCOSE BLDC GLUCOMTR-MCNC: 41 MG/DL — CRITICAL LOW (ref 70–99)
GLUCOSE BLDC GLUCOMTR-MCNC: 55 MG/DL — LOW (ref 70–99)
GLUCOSE BLDC GLUCOMTR-MCNC: 58 MG/DL — LOW (ref 70–99)
GLUCOSE BLDC GLUCOMTR-MCNC: 59 MG/DL — LOW (ref 70–99)
GLUCOSE BLDC GLUCOMTR-MCNC: 64 MG/DL — LOW (ref 70–99)
GLUCOSE BLDC GLUCOMTR-MCNC: 67 MG/DL — LOW (ref 70–99)
GLUCOSE BLDC GLUCOMTR-MCNC: 74 MG/DL — SIGNIFICANT CHANGE UP (ref 70–99)
GLUCOSE BLDC GLUCOMTR-MCNC: 76 MG/DL — SIGNIFICANT CHANGE UP (ref 70–99)
GLUCOSE BLDC GLUCOMTR-MCNC: 79 MG/DL — SIGNIFICANT CHANGE UP (ref 70–99)
GLUCOSE BLDC GLUCOMTR-MCNC: 95 MG/DL — SIGNIFICANT CHANGE UP (ref 70–99)
GLUCOSE BLDC GLUCOMTR-MCNC: >600 MG/DL — CRITICAL HIGH (ref 70–99)
GLUCOSE SERPL-MCNC: 195 MG/DL — HIGH (ref 70–99)
GLUCOSE SERPL-MCNC: 202 MG/DL — HIGH (ref 70–99)
GLUCOSE SERPL-MCNC: 260 MG/DL — HIGH (ref 70–99)
GLUCOSE SERPL-MCNC: 284 MG/DL — HIGH (ref 70–99)
GLUCOSE SERPL-MCNC: 319 MG/DL — HIGH (ref 70–99)
GLUCOSE SERPL-MCNC: 651 MG/DL — CRITICAL HIGH (ref 70–99)
GLUCOSE SERPL-MCNC: 93 MG/DL — SIGNIFICANT CHANGE UP (ref 70–99)
GLUCOSE UR QL: 250 MG/DL
GRAN CASTS # UR COMP ASSIST: ABNORMAL /LPF
HCG SERPL QL: NEGATIVE — SIGNIFICANT CHANGE UP
HCO3 BLDA-SCNC: 8 MMOL/L — CRITICAL LOW (ref 21–28)
HCT VFR BLD CALC: 21.7 % — LOW (ref 37–47)
HCT VFR BLD CALC: 33.8 % — LOW (ref 37–47)
HCT VFR BLD CALC: 37 % — SIGNIFICANT CHANGE UP (ref 37–47)
HCT VFR BLD CALC: 37.5 % — SIGNIFICANT CHANGE UP (ref 37–47)
HCT VFR BLD CALC: 37.5 % — SIGNIFICANT CHANGE UP (ref 37–47)
HCYS SERPL-MCNC: 5.7 UMOL/L — SIGNIFICANT CHANGE UP
HCYS SERPL-MCNC: 6 UMOL/L — SIGNIFICANT CHANGE UP
HDLC SERPL-MCNC: 20 MG/DL — LOW
HGB BLD-MCNC: 10.9 G/DL — LOW (ref 12–16)
HGB BLD-MCNC: 12.2 G/DL — SIGNIFICANT CHANGE UP (ref 12–16)
HGB BLD-MCNC: 12.7 G/DL — SIGNIFICANT CHANGE UP (ref 12–16)
HGB BLD-MCNC: 12.7 G/DL — SIGNIFICANT CHANGE UP (ref 12–16)
HGB BLD-MCNC: 6.7 G/DL — CRITICAL LOW (ref 12–16)
HIV 1+2 AB+HIV1 P24 AG SERPL QL IA: SIGNIFICANT CHANGE UP
HOROWITZ INDEX BLDA+IHG-RTO: 60 — SIGNIFICANT CHANGE UP
HYPOCHROMIA BLD QL: SLIGHT — SIGNIFICANT CHANGE UP
IMM GRANULOCYTES NFR BLD AUTO: 1.1 % — HIGH (ref 0.1–0.3)
IMM GRANULOCYTES NFR BLD AUTO: 1.1 % — HIGH (ref 0.1–0.3)
INR BLD: 2.02 RATIO — HIGH (ref 0.65–1.3)
INR BLD: 2.82 RATIO — HIGH (ref 0.65–1.3)
KETONES UR-MCNC: NEGATIVE — SIGNIFICANT CHANGE UP
LACTATE SERPL-SCNC: 3.8 MMOL/L — HIGH (ref 0.7–2)
LACTATE SERPL-SCNC: 5.1 MMOL/L — CRITICAL HIGH (ref 0.7–2)
LACTATE SERPL-SCNC: 8.3 MMOL/L — CRITICAL HIGH (ref 0.7–2)
LEUKOCYTE ESTERASE UR-ACNC: NEGATIVE — SIGNIFICANT CHANGE UP
LIDOCAIN IGE QN: 81 U/L — HIGH (ref 7–60)
LIPID PNL WITH DIRECT LDL SERPL: 10 MG/DL — SIGNIFICANT CHANGE UP
LYMPHOCYTES # BLD AUTO: 11.3 % — LOW (ref 20.5–51.1)
LYMPHOCYTES # BLD AUTO: 2.46 K/UL — SIGNIFICANT CHANGE UP (ref 1.2–3.4)
LYMPHOCYTES # BLD AUTO: 2.82 K/UL — SIGNIFICANT CHANGE UP (ref 1.2–3.4)
LYMPHOCYTES # BLD AUTO: 34.5 % — SIGNIFICANT CHANGE UP (ref 20.5–51.1)
MAGNESIUM SERPL-MCNC: 1.6 MG/DL — LOW (ref 1.8–2.4)
MAGNESIUM SERPL-MCNC: 1.8 MG/DL — SIGNIFICANT CHANGE UP (ref 1.8–2.4)
MAGNESIUM SERPL-MCNC: 1.9 MG/DL — SIGNIFICANT CHANGE UP (ref 1.8–2.4)
MAGNESIUM SERPL-MCNC: 1.9 MG/DL — SIGNIFICANT CHANGE UP (ref 1.8–2.4)
MAGNESIUM SERPL-MCNC: 2.8 MG/DL — HIGH (ref 1.8–2.4)
MANUAL SMEAR VERIFICATION: SIGNIFICANT CHANGE UP
MCHC RBC-ENTMCNC: 27.9 PG — SIGNIFICANT CHANGE UP (ref 27–31)
MCHC RBC-ENTMCNC: 28.2 PG — SIGNIFICANT CHANGE UP (ref 27–31)
MCHC RBC-ENTMCNC: 28.3 PG — SIGNIFICANT CHANGE UP (ref 27–31)
MCHC RBC-ENTMCNC: 28.4 PG — SIGNIFICANT CHANGE UP (ref 27–31)
MCHC RBC-ENTMCNC: 28.5 PG — SIGNIFICANT CHANGE UP (ref 27–31)
MCHC RBC-ENTMCNC: 30.9 G/DL — LOW (ref 32–37)
MCHC RBC-ENTMCNC: 32.2 G/DL — SIGNIFICANT CHANGE UP (ref 32–37)
MCHC RBC-ENTMCNC: 33 G/DL — SIGNIFICANT CHANGE UP (ref 32–37)
MCHC RBC-ENTMCNC: 33.9 G/DL — SIGNIFICANT CHANGE UP (ref 32–37)
MCHC RBC-ENTMCNC: 33.9 G/DL — SIGNIFICANT CHANGE UP (ref 32–37)
MCV RBC AUTO: 83.9 FL — SIGNIFICANT CHANGE UP (ref 81–99)
MCV RBC AUTO: 84.1 FL — SIGNIFICANT CHANGE UP (ref 81–99)
MCV RBC AUTO: 85.6 FL — SIGNIFICANT CHANGE UP (ref 81–99)
MCV RBC AUTO: 87.8 FL — SIGNIFICANT CHANGE UP (ref 81–99)
MCV RBC AUTO: 90.4 FL — SIGNIFICANT CHANGE UP (ref 81–99)
METHADONE UR-MCNC: NEGATIVE — SIGNIFICANT CHANGE UP
MONOCYTES # BLD AUTO: 0.1 K/UL — SIGNIFICANT CHANGE UP (ref 0.1–0.6)
MONOCYTES # BLD AUTO: 0.67 K/UL — HIGH (ref 0.1–0.6)
MONOCYTES NFR BLD AUTO: 1.2 % — LOW (ref 1.7–9.3)
MONOCYTES NFR BLD AUTO: 3.1 % — SIGNIFICANT CHANGE UP (ref 1.7–9.3)
NEUTROPHILS # BLD AUTO: 18.41 K/UL — HIGH (ref 1.4–6.5)
NEUTROPHILS # BLD AUTO: 5.11 K/UL — SIGNIFICANT CHANGE UP (ref 1.4–6.5)
NEUTROPHILS NFR BLD AUTO: 62.5 % — SIGNIFICANT CHANGE UP (ref 42.2–75.2)
NEUTROPHILS NFR BLD AUTO: 84.4 % — HIGH (ref 42.2–75.2)
NEUTS BAND # BLD: 30 % — HIGH (ref 0–6)
NITRITE UR-MCNC: NEGATIVE — SIGNIFICANT CHANGE UP
NON HDL CHOLESTEROL: 28 MG/DL — SIGNIFICANT CHANGE UP
NRBC # BLD: 0 /100 WBCS — SIGNIFICANT CHANGE UP (ref 0–0)
NRBC # BLD: 0 /100 — SIGNIFICANT CHANGE UP (ref 0–0)
OPIATES UR-MCNC: NEGATIVE — SIGNIFICANT CHANGE UP
OVALOCYTES BLD QL SMEAR: SLIGHT — SIGNIFICANT CHANGE UP
PCO2 BLDA: 23 MMHG — LOW (ref 32–35)
PCP UR-MCNC: NEGATIVE — SIGNIFICANT CHANGE UP
PH BLDA: 7.17 — CRITICAL LOW (ref 7.35–7.45)
PH UR: 5.5 — SIGNIFICANT CHANGE UP (ref 5–8)
PHOSPHATE SERPL-MCNC: 4.2 MG/DL — SIGNIFICANT CHANGE UP (ref 2.1–4.9)
PHOSPHATE SERPL-MCNC: 4.9 MG/DL — SIGNIFICANT CHANGE UP (ref 2.1–4.9)
PHOSPHATE SERPL-MCNC: 5.2 MG/DL — HIGH (ref 2.1–4.9)
PHOSPHATE SERPL-MCNC: 6 MG/DL — HIGH (ref 2.1–4.9)
PHOSPHATE SERPL-MCNC: 6.6 MG/DL — HIGH (ref 2.1–4.9)
PLAT MORPH BLD: NORMAL — SIGNIFICANT CHANGE UP
PLATELET # BLD AUTO: 167 K/UL — SIGNIFICANT CHANGE UP (ref 130–400)
PLATELET # BLD AUTO: 170 K/UL — SIGNIFICANT CHANGE UP (ref 130–400)
PLATELET # BLD AUTO: 203 K/UL — SIGNIFICANT CHANGE UP (ref 130–400)
PLATELET # BLD AUTO: 217 K/UL — SIGNIFICANT CHANGE UP (ref 130–400)
PLATELET # BLD AUTO: 95 K/UL — LOW (ref 130–400)
PLATELET CLUMP BLD QL SMEAR: ABNORMAL
PLATELET COUNT - ESTIMATE: NORMAL — SIGNIFICANT CHANGE UP
PO2 BLDA: 237 MMHG — HIGH (ref 83–108)
POTASSIUM SERPL-MCNC: 3.4 MMOL/L — LOW (ref 3.5–5)
POTASSIUM SERPL-MCNC: 4 MMOL/L — SIGNIFICANT CHANGE UP (ref 3.5–5)
POTASSIUM SERPL-MCNC: 4.1 MMOL/L — SIGNIFICANT CHANGE UP (ref 3.5–5)
POTASSIUM SERPL-MCNC: 4.2 MMOL/L — SIGNIFICANT CHANGE UP (ref 3.5–5)
POTASSIUM SERPL-MCNC: 4.2 MMOL/L — SIGNIFICANT CHANGE UP (ref 3.5–5)
POTASSIUM SERPL-MCNC: 4.6 MMOL/L — SIGNIFICANT CHANGE UP (ref 3.5–5)
POTASSIUM SERPL-MCNC: 4.6 MMOL/L — SIGNIFICANT CHANGE UP (ref 3.5–5)
POTASSIUM SERPL-SCNC: 3.4 MMOL/L — LOW (ref 3.5–5)
POTASSIUM SERPL-SCNC: 4 MMOL/L — SIGNIFICANT CHANGE UP (ref 3.5–5)
POTASSIUM SERPL-SCNC: 4.1 MMOL/L — SIGNIFICANT CHANGE UP (ref 3.5–5)
POTASSIUM SERPL-SCNC: 4.2 MMOL/L — SIGNIFICANT CHANGE UP (ref 3.5–5)
POTASSIUM SERPL-SCNC: 4.2 MMOL/L — SIGNIFICANT CHANGE UP (ref 3.5–5)
POTASSIUM SERPL-SCNC: 4.6 MMOL/L — SIGNIFICANT CHANGE UP (ref 3.5–5)
POTASSIUM SERPL-SCNC: 4.6 MMOL/L — SIGNIFICANT CHANGE UP (ref 3.5–5)
PROPOXYPHENE QUALITATIVE URINE RESULT: NEGATIVE — SIGNIFICANT CHANGE UP
PROT SERPL-MCNC: 3.1 G/DL — LOW (ref 6–8)
PROT SERPL-MCNC: 3.1 G/DL — LOW (ref 6–8)
PROT SERPL-MCNC: 4 G/DL — LOW (ref 6–8)
PROT SERPL-MCNC: 4.3 G/DL — LOW (ref 6–8)
PROT SERPL-MCNC: 4.4 G/DL — LOW (ref 6–8)
PROT SERPL-MCNC: 4.9 G/DL — LOW (ref 6–8)
PROT SERPL-MCNC: 5 G/DL — LOW (ref 6–8)
PROT UR-MCNC: 100 MG/DL
PROTHROM AB SERPL-ACNC: 23.1 SEC — HIGH (ref 9.95–12.87)
PROTHROM AB SERPL-ACNC: 32.1 SEC — HIGH (ref 9.95–12.87)
RBC # BLD: 2.4 M/UL — LOW (ref 4.2–5.4)
RBC # BLD: 3.85 M/UL — LOW (ref 4.2–5.4)
RBC # BLD: 4.32 M/UL — SIGNIFICANT CHANGE UP (ref 4.2–5.4)
RBC # BLD: 4.46 M/UL — SIGNIFICANT CHANGE UP (ref 4.2–5.4)
RBC # BLD: 4.47 M/UL — SIGNIFICANT CHANGE UP (ref 4.2–5.4)
RBC # FLD: 14.5 % — SIGNIFICANT CHANGE UP (ref 11.5–14.5)
RBC # FLD: 14.6 % — HIGH (ref 11.5–14.5)
RBC # FLD: 14.6 % — HIGH (ref 11.5–14.5)
RBC # FLD: 14.9 % — HIGH (ref 11.5–14.5)
RBC # FLD: 15.9 % — HIGH (ref 11.5–14.5)
RBC BLD AUTO: ABNORMAL
RBC CASTS # UR COMP ASSIST: ABNORMAL /HPF
SALICYLATES SERPL-MCNC: <0.3 MG/DL — LOW (ref 4–30)
SAO2 % BLDA: 99.9 % — HIGH (ref 94–98)
SARS-COV-2 RNA SPEC QL NAA+PROBE: DETECTED
SODIUM SERPL-SCNC: 126 MMOL/L — LOW (ref 135–146)
SODIUM SERPL-SCNC: 128 MMOL/L — LOW (ref 135–146)
SODIUM SERPL-SCNC: 130 MMOL/L — LOW (ref 135–146)
SODIUM SERPL-SCNC: 132 MMOL/L — LOW (ref 135–146)
SODIUM SERPL-SCNC: 133 MMOL/L — LOW (ref 135–146)
SODIUM SERPL-SCNC: 134 MMOL/L — LOW (ref 135–146)
SODIUM SERPL-SCNC: 136 MMOL/L — SIGNIFICANT CHANGE UP (ref 135–146)
SP GR SPEC: 1.02 — SIGNIFICANT CHANGE UP (ref 1.01–1.03)
T3 SERPL-MCNC: 101 NG/DL — SIGNIFICANT CHANGE UP (ref 80–200)
T4 AB SER-ACNC: 2.6 UG/DL — LOW (ref 4.6–12)
THC UR QL: NEGATIVE — SIGNIFICANT CHANGE UP
TRIGL SERPL-MCNC: 99 MG/DL — SIGNIFICANT CHANGE UP
TROPONIN T SERPL-MCNC: 0.05 NG/ML — CRITICAL HIGH
TROPONIN T SERPL-MCNC: 0.06 NG/ML — CRITICAL HIGH
TROPONIN T SERPL-MCNC: 0.1 NG/ML — CRITICAL HIGH
TROPONIN T SERPL-MCNC: 0.1 NG/ML — CRITICAL HIGH
TROPONIN T SERPL-MCNC: 0.16 NG/ML — CRITICAL HIGH
TSH SERPL-MCNC: 19.59 UIU/ML — HIGH (ref 0.27–4.2)
TSH SERPL-MCNC: 3.49 UIU/ML — SIGNIFICANT CHANGE UP (ref 0.27–4.2)
UROBILINOGEN FLD QL: 0.2 MG/DL — SIGNIFICANT CHANGE UP
VANCOMYCIN TROUGH SERPL-MCNC: 5 UG/ML — SIGNIFICANT CHANGE UP (ref 5–10)
VIT B12 SERPL-MCNC: >2000 PG/ML — HIGH (ref 232–1245)
VIT B12 SERPL-MCNC: >2000 PG/ML — HIGH (ref 232–1245)
WBC # BLD: 17.49 K/UL — HIGH (ref 4.8–10.8)
WBC # BLD: 21.82 K/UL — HIGH (ref 4.8–10.8)
WBC # BLD: 26.32 K/UL — HIGH (ref 4.8–10.8)
WBC # BLD: 31.82 K/UL — HIGH (ref 4.8–10.8)
WBC # BLD: 8.18 K/UL — SIGNIFICANT CHANGE UP (ref 4.8–10.8)
WBC # FLD AUTO: 17.49 K/UL — HIGH (ref 4.8–10.8)
WBC # FLD AUTO: 21.82 K/UL — HIGH (ref 4.8–10.8)
WBC # FLD AUTO: 26.32 K/UL — HIGH (ref 4.8–10.8)
WBC # FLD AUTO: 31.82 K/UL — HIGH (ref 4.8–10.8)
WBC # FLD AUTO: 8.18 K/UL — SIGNIFICANT CHANGE UP (ref 4.8–10.8)
WBC UR QL: ABNORMAL /HPF

## 2022-01-01 PROCEDURE — 99223 1ST HOSP IP/OBS HIGH 75: CPT

## 2022-01-01 PROCEDURE — 99292 CRITICAL CARE ADDL 30 MIN: CPT

## 2022-01-01 PROCEDURE — 93010 ELECTROCARDIOGRAM REPORT: CPT

## 2022-01-01 PROCEDURE — 95819 EEG AWAKE AND ASLEEP: CPT | Mod: 26

## 2022-01-01 PROCEDURE — 99233 SBSQ HOSP IP/OBS HIGH 50: CPT

## 2022-01-01 PROCEDURE — 71045 X-RAY EXAM CHEST 1 VIEW: CPT | Mod: 26

## 2022-01-01 PROCEDURE — 95720 EEG PHY/QHP EA INCR W/VEEG: CPT

## 2022-01-01 PROCEDURE — 99291 CRITICAL CARE FIRST HOUR: CPT

## 2022-01-01 PROCEDURE — 76705 ECHO EXAM OF ABDOMEN: CPT | Mod: 26

## 2022-01-01 PROCEDURE — 93306 TTE W/DOPPLER COMPLETE: CPT | Mod: 26

## 2022-01-01 PROCEDURE — 93970 EXTREMITY STUDY: CPT | Mod: 26

## 2022-01-01 PROCEDURE — 99254 IP/OBS CNSLTJ NEW/EST MOD 60: CPT

## 2022-01-01 RX ORDER — SODIUM CHLORIDE 9 MG/ML
1000 INJECTION, SOLUTION INTRAVENOUS
Refills: 0 | Status: DISCONTINUED | OUTPATIENT
Start: 2022-01-01 | End: 2022-01-01

## 2022-01-01 RX ORDER — FUROSEMIDE 40 MG
40 TABLET ORAL ONCE
Refills: 0 | Status: COMPLETED | OUTPATIENT
Start: 2022-01-01 | End: 2022-01-01

## 2022-01-01 RX ORDER — LEVETIRACETAM 250 MG/1
500 TABLET, FILM COATED ORAL ONCE
Refills: 0 | Status: COMPLETED | OUTPATIENT
Start: 2022-01-01 | End: 2022-01-01

## 2022-01-01 RX ORDER — SODIUM CHLORIDE 9 MG/ML
1000 INJECTION INTRAMUSCULAR; INTRAVENOUS; SUBCUTANEOUS ONCE
Refills: 0 | Status: COMPLETED | OUTPATIENT
Start: 2022-01-01 | End: 2022-01-01

## 2022-01-01 RX ORDER — LEVOTHYROXINE SODIUM 125 MCG
300 TABLET ORAL ONCE
Refills: 0 | Status: DISCONTINUED | OUTPATIENT
Start: 2022-01-01 | End: 2022-01-01

## 2022-01-01 RX ORDER — THIAMINE MONONITRATE (VIT B1) 100 MG
300 TABLET ORAL EVERY 8 HOURS
Refills: 0 | Status: DISCONTINUED | OUTPATIENT
Start: 2022-01-01 | End: 2022-01-01

## 2022-01-01 RX ORDER — PREGABALIN 225 MG/1
500 CAPSULE ORAL ONCE
Refills: 0 | Status: DISCONTINUED | OUTPATIENT
Start: 2022-01-01 | End: 2022-01-01

## 2022-01-01 RX ORDER — MAGNESIUM SULFATE 500 MG/ML
2 VIAL (ML) INJECTION ONCE
Refills: 0 | Status: COMPLETED | OUTPATIENT
Start: 2022-01-01 | End: 2022-01-01

## 2022-01-01 RX ORDER — POTASSIUM PHOSPHATE, MONOBASIC POTASSIUM PHOSPHATE, DIBASIC 236; 224 MG/ML; MG/ML
30 INJECTION, SOLUTION INTRAVENOUS ONCE
Refills: 0 | Status: DISCONTINUED | OUTPATIENT
Start: 2022-01-01 | End: 2022-01-01

## 2022-01-01 RX ORDER — CHLORHEXIDINE GLUCONATE 213 G/1000ML
1 SOLUTION TOPICAL
Refills: 0 | Status: DISCONTINUED | OUTPATIENT
Start: 2022-01-01 | End: 2022-01-01

## 2022-01-01 RX ORDER — LEVETIRACETAM 250 MG/1
500 TABLET, FILM COATED ORAL EVERY 12 HOURS
Refills: 0 | Status: DISCONTINUED | OUTPATIENT
Start: 2022-01-01 | End: 2022-01-01

## 2022-01-01 RX ORDER — DEXTROSE 50 % IN WATER 50 %
50 SYRINGE (ML) INTRAVENOUS ONCE
Refills: 0 | Status: COMPLETED | OUTPATIENT
Start: 2022-01-01 | End: 2022-01-01

## 2022-01-01 RX ORDER — DOPAMINE HYDROCHLORIDE 40 MG/ML
5 INJECTION, SOLUTION, CONCENTRATE INTRAVENOUS
Qty: 800 | Refills: 0 | Status: DISCONTINUED | OUTPATIENT
Start: 2022-01-01 | End: 2022-01-01

## 2022-01-01 RX ORDER — SODIUM CHLORIDE 9 MG/ML
2000 INJECTION, SOLUTION INTRAVENOUS ONCE
Refills: 0 | Status: COMPLETED | OUTPATIENT
Start: 2022-01-01 | End: 2022-01-01

## 2022-01-01 RX ORDER — DEXTROSE 10 % IN WATER 10 %
1000 INTRAVENOUS SOLUTION INTRAVENOUS
Refills: 0 | Status: DISCONTINUED | OUTPATIENT
Start: 2022-01-01 | End: 2022-01-01

## 2022-01-01 RX ORDER — POTASSIUM CHLORIDE 20 MEQ
20 PACKET (EA) ORAL
Refills: 0 | Status: COMPLETED | OUTPATIENT
Start: 2022-01-01 | End: 2022-01-01

## 2022-01-01 RX ORDER — SODIUM BICARBONATE 1 MEQ/ML
50 SYRINGE (ML) INTRAVENOUS
Refills: 0 | Status: DISCONTINUED | OUTPATIENT
Start: 2022-01-01 | End: 2022-01-01

## 2022-01-01 RX ORDER — PHYTONADIONE (VIT K1) 5 MG
10 TABLET ORAL ONCE
Refills: 0 | Status: COMPLETED | OUTPATIENT
Start: 2022-01-01 | End: 2022-01-01

## 2022-01-01 RX ORDER — THIAMINE MONONITRATE (VIT B1) 100 MG
500 TABLET ORAL ONCE
Refills: 0 | Status: DISCONTINUED | OUTPATIENT
Start: 2022-01-01 | End: 2022-01-01

## 2022-01-01 RX ORDER — LEVETIRACETAM 250 MG/1
1000 TABLET, FILM COATED ORAL EVERY 12 HOURS
Refills: 0 | Status: DISCONTINUED | OUTPATIENT
Start: 2022-01-01 | End: 2022-01-01

## 2022-01-01 RX ORDER — PHENYLEPHRINE HYDROCHLORIDE 10 MG/ML
0.1 INJECTION INTRAVENOUS
Qty: 160 | Refills: 0 | Status: DISCONTINUED | OUTPATIENT
Start: 2022-01-01 | End: 2022-01-01

## 2022-01-01 RX ORDER — MIDAZOLAM HYDROCHLORIDE 1 MG/ML
2 INJECTION, SOLUTION INTRAMUSCULAR; INTRAVENOUS ONCE
Refills: 0 | Status: DISCONTINUED | OUTPATIENT
Start: 2022-01-01 | End: 2022-01-01

## 2022-01-01 RX ORDER — PANTOPRAZOLE SODIUM 20 MG/1
80 TABLET, DELAYED RELEASE ORAL ONCE
Refills: 0 | Status: COMPLETED | OUTPATIENT
Start: 2022-01-01 | End: 2022-01-01

## 2022-01-01 RX ORDER — PHENYLEPHRINE HYDROCHLORIDE 10 MG/ML
0.1 INJECTION INTRAVENOUS
Qty: 40 | Refills: 0 | Status: DISCONTINUED | OUTPATIENT
Start: 2022-01-01 | End: 2022-01-01

## 2022-01-01 RX ORDER — HYDROCORTISONE 20 MG
100 TABLET ORAL ONCE
Refills: 0 | Status: COMPLETED | OUTPATIENT
Start: 2022-01-01 | End: 2022-01-01

## 2022-01-01 RX ORDER — FOLIC ACID 0.8 MG
1 TABLET ORAL DAILY
Refills: 0 | Status: DISCONTINUED | OUTPATIENT
Start: 2022-01-01 | End: 2022-01-01

## 2022-01-01 RX ORDER — SODIUM BICARBONATE 1 MEQ/ML
50 SYRINGE (ML) INTRAVENOUS ONCE
Refills: 0 | Status: COMPLETED | OUTPATIENT
Start: 2022-01-01 | End: 2022-01-01

## 2022-01-01 RX ORDER — NOREPINEPHRINE BITARTRATE/D5W 8 MG/250ML
0.05 PLASTIC BAG, INJECTION (ML) INTRAVENOUS
Qty: 32 | Refills: 0 | Status: DISCONTINUED | OUTPATIENT
Start: 2022-01-01 | End: 2022-01-01

## 2022-01-01 RX ORDER — VANCOMYCIN HCL 1 G
1000 VIAL (EA) INTRAVENOUS ONCE
Refills: 0 | Status: COMPLETED | OUTPATIENT
Start: 2022-01-01 | End: 2022-01-01

## 2022-01-01 RX ORDER — NOREPINEPHRINE BITARTRATE/D5W 8 MG/250ML
0.05 PLASTIC BAG, INJECTION (ML) INTRAVENOUS
Qty: 16 | Refills: 0 | Status: DISCONTINUED | OUTPATIENT
Start: 2022-01-01 | End: 2022-01-01

## 2022-01-01 RX ORDER — SODIUM BICARBONATE 1 MEQ/ML
50 SYRINGE (ML) INTRAVENOUS ONCE
Refills: 0 | Status: DISCONTINUED | OUTPATIENT
Start: 2022-01-01 | End: 2022-01-01

## 2022-01-01 RX ORDER — LEVOTHYROXINE SODIUM 125 MCG
50 TABLET ORAL AT BEDTIME
Refills: 0 | Status: DISCONTINUED | OUTPATIENT
Start: 2022-01-01 | End: 2022-01-01

## 2022-01-01 RX ORDER — HYDROCORTISONE 20 MG
100 TABLET ORAL EVERY 8 HOURS
Refills: 0 | Status: DISCONTINUED | OUTPATIENT
Start: 2022-01-01 | End: 2022-01-01

## 2022-01-01 RX ORDER — VASOPRESSIN 20 [USP'U]/ML
0.04 INJECTION INTRAVENOUS
Qty: 50 | Refills: 0 | Status: DISCONTINUED | OUTPATIENT
Start: 2022-01-01 | End: 2022-01-01

## 2022-01-01 RX ORDER — MAGNESIUM SULFATE 500 MG/ML
1 VIAL (ML) INJECTION ONCE
Refills: 0 | Status: COMPLETED | OUTPATIENT
Start: 2022-01-01 | End: 2022-01-01

## 2022-01-01 RX ORDER — PREGABALIN 225 MG/1
1000 CAPSULE ORAL ONCE
Refills: 0 | Status: COMPLETED | OUTPATIENT
Start: 2022-01-01 | End: 2022-01-01

## 2022-01-01 RX ORDER — LEVOTHYROXINE SODIUM 125 MCG
50 TABLET ORAL ONCE
Refills: 0 | Status: COMPLETED | OUTPATIENT
Start: 2022-01-01 | End: 2022-01-01

## 2022-01-01 RX ORDER — MIDAZOLAM HYDROCHLORIDE 1 MG/ML
0.02 INJECTION, SOLUTION INTRAMUSCULAR; INTRAVENOUS
Qty: 100 | Refills: 0 | Status: DISCONTINUED | OUTPATIENT
Start: 2022-01-01 | End: 2022-01-01

## 2022-01-01 RX ORDER — LEVETIRACETAM 250 MG/1
500 TABLET, FILM COATED ORAL ONCE
Refills: 0 | Status: DISCONTINUED | OUTPATIENT
Start: 2022-01-01 | End: 2022-01-01

## 2022-01-01 RX ORDER — CHLORHEXIDINE GLUCONATE 213 G/1000ML
15 SOLUTION TOPICAL
Refills: 0 | Status: DISCONTINUED | OUTPATIENT
Start: 2022-01-01 | End: 2022-01-01

## 2022-01-01 RX ORDER — FOLIC ACID 0.8 MG
1 TABLET ORAL ONCE
Refills: 0 | Status: DISCONTINUED | OUTPATIENT
Start: 2022-01-01 | End: 2022-01-01

## 2022-01-01 RX ORDER — DEXTROSE 50 % IN WATER 50 %
100 SYRINGE (ML) INTRAVENOUS ONCE
Refills: 0 | Status: COMPLETED | OUTPATIENT
Start: 2022-01-01 | End: 2022-01-01

## 2022-01-01 RX ORDER — PANTOPRAZOLE SODIUM 20 MG/1
40 TABLET, DELAYED RELEASE ORAL
Refills: 0 | Status: DISCONTINUED | OUTPATIENT
Start: 2022-01-01 | End: 2022-01-01

## 2022-01-01 RX ORDER — CEFEPIME 1 G/1
2000 INJECTION, POWDER, FOR SOLUTION INTRAMUSCULAR; INTRAVENOUS ONCE
Refills: 0 | Status: COMPLETED | OUTPATIENT
Start: 2022-01-01 | End: 2022-01-01

## 2022-01-01 RX ORDER — DOPAMINE HYDROCHLORIDE 40 MG/ML
5 INJECTION, SOLUTION, CONCENTRATE INTRAVENOUS
Qty: 400 | Refills: 0 | Status: DISCONTINUED | OUTPATIENT
Start: 2022-01-01 | End: 2022-01-01

## 2022-01-01 RX ORDER — CALCIUM CARBONATE 500(1250)
1 TABLET ORAL EVERY 6 HOURS
Refills: 0 | Status: DISCONTINUED | OUTPATIENT
Start: 2022-01-01 | End: 2022-01-01

## 2022-01-01 RX ADMIN — VASOPRESSIN 2.4 UNIT(S)/MIN: 20 INJECTION INTRAVENOUS at 20:36

## 2022-01-01 RX ADMIN — CHLORHEXIDINE GLUCONATE 1 APPLICATION(S): 213 SOLUTION TOPICAL at 06:05

## 2022-01-01 RX ADMIN — Medication 25 MILLILITER(S): at 19:24

## 2022-01-01 RX ADMIN — Medication 1 TABLET(S): at 12:57

## 2022-01-01 RX ADMIN — Medication 100 MILLIGRAM(S): at 22:04

## 2022-01-01 RX ADMIN — Medication 50 MILLIEQUIVALENT(S): at 11:34

## 2022-01-01 RX ADMIN — Medication 50 MILLIEQUIVALENT(S): at 11:47

## 2022-01-01 RX ADMIN — Medication 50 MILLILITER(S): at 11:26

## 2022-01-01 RX ADMIN — Medication 103 MILLIGRAM(S): at 15:51

## 2022-01-01 RX ADMIN — PHENYLEPHRINE HYDROCHLORIDE 0.68 MICROGRAM(S)/KG/MIN: 10 INJECTION INTRAVENOUS at 04:22

## 2022-01-01 RX ADMIN — Medication 2.39 MICROGRAM(S)/KG/MIN: at 19:24

## 2022-01-01 RX ADMIN — Medication 1.7 MICROGRAM(S)/KG/MIN: at 13:14

## 2022-01-01 RX ADMIN — MIDAZOLAM HYDROCHLORIDE 0.73 MG/KG/HR: 1 INJECTION, SOLUTION INTRAMUSCULAR; INTRAVENOUS at 20:35

## 2022-01-01 RX ADMIN — CEFEPIME 100 MILLIGRAM(S): 1 INJECTION, POWDER, FOR SOLUTION INTRAMUSCULAR; INTRAVENOUS at 09:17

## 2022-01-01 RX ADMIN — Medication 1.7 MICROGRAM(S)/KG/MIN: at 18:51

## 2022-01-01 RX ADMIN — Medication 100 GRAM(S): at 00:34

## 2022-01-01 RX ADMIN — Medication 1.7 MICROGRAM(S)/KG/MIN: at 20:36

## 2022-01-01 RX ADMIN — PHENYLEPHRINE HYDROCHLORIDE 0.68 MICROGRAM(S)/KG/MIN: 10 INJECTION INTRAVENOUS at 13:38

## 2022-01-01 RX ADMIN — SODIUM CHLORIDE 70 MILLILITER(S): 9 INJECTION, SOLUTION INTRAVENOUS at 18:56

## 2022-01-01 RX ADMIN — PHENYLEPHRINE HYDROCHLORIDE 0.68 MICROGRAM(S)/KG/MIN: 10 INJECTION INTRAVENOUS at 08:47

## 2022-01-01 RX ADMIN — LEVETIRACETAM 400 MILLIGRAM(S): 250 TABLET, FILM COATED ORAL at 06:06

## 2022-01-01 RX ADMIN — Medication 100 MILLIGRAM(S): at 21:11

## 2022-01-01 RX ADMIN — Medication 100 MILLIGRAM(S): at 09:32

## 2022-01-01 RX ADMIN — MIDAZOLAM HYDROCHLORIDE 0.73 MG/KG/HR: 1 INJECTION, SOLUTION INTRAMUSCULAR; INTRAVENOUS at 08:46

## 2022-01-01 RX ADMIN — PHENYLEPHRINE HYDROCHLORIDE 1.36 MICROGRAM(S)/KG/MIN: 10 INJECTION INTRAVENOUS at 09:18

## 2022-01-01 RX ADMIN — DOPAMINE HYDROCHLORIDE 6.81 MICROGRAM(S)/KG/MIN: 40 INJECTION, SOLUTION, CONCENTRATE INTRAVENOUS at 09:02

## 2022-01-01 RX ADMIN — PHENYLEPHRINE HYDROCHLORIDE 0.68 MICROGRAM(S)/KG/MIN: 10 INJECTION INTRAVENOUS at 00:09

## 2022-01-01 RX ADMIN — Medication 1.7 MICROGRAM(S)/KG/MIN: at 22:08

## 2022-01-01 RX ADMIN — DOPAMINE HYDROCHLORIDE 6.81 MICROGRAM(S)/KG/MIN: 40 INJECTION, SOLUTION, CONCENTRATE INTRAVENOUS at 22:06

## 2022-01-01 RX ADMIN — Medication 50 MILLIEQUIVALENT(S): at 04:22

## 2022-01-01 RX ADMIN — Medication 1.7 MICROGRAM(S)/KG/MIN: at 02:51

## 2022-01-01 RX ADMIN — Medication 1.7 MICROGRAM(S)/KG/MIN: at 08:46

## 2022-01-01 RX ADMIN — Medication 25 MILLILITER(S): at 16:50

## 2022-01-01 RX ADMIN — Medication 150 GRAM(S): at 07:00

## 2022-01-01 RX ADMIN — Medication 103 MILLIGRAM(S): at 15:48

## 2022-01-01 RX ADMIN — LEVETIRACETAM 400 MILLIGRAM(S): 250 TABLET, FILM COATED ORAL at 05:26

## 2022-01-01 RX ADMIN — Medication 50 MILLILITER(S): at 18:55

## 2022-01-01 RX ADMIN — Medication 103 MILLIGRAM(S): at 22:05

## 2022-01-01 RX ADMIN — Medication 1.7 MICROGRAM(S)/KG/MIN: at 09:02

## 2022-01-01 RX ADMIN — CHLORHEXIDINE GLUCONATE 15 MILLILITER(S): 213 SOLUTION TOPICAL at 18:13

## 2022-01-01 RX ADMIN — MIDAZOLAM HYDROCHLORIDE 0.73 MG/KG/HR: 1 INJECTION, SOLUTION INTRAMUSCULAR; INTRAVENOUS at 22:07

## 2022-01-01 RX ADMIN — Medication 103 MILLIGRAM(S): at 05:27

## 2022-01-01 RX ADMIN — DOPAMINE HYDROCHLORIDE 6.81 MICROGRAM(S)/KG/MIN: 40 INJECTION, SOLUTION, CONCENTRATE INTRAVENOUS at 08:46

## 2022-01-01 RX ADMIN — DOPAMINE HYDROCHLORIDE 6.81 MICROGRAM(S)/KG/MIN: 40 INJECTION, SOLUTION, CONCENTRATE INTRAVENOUS at 18:51

## 2022-01-01 RX ADMIN — VASOPRESSIN 2.4 UNIT(S)/MIN: 20 INJECTION INTRAVENOUS at 08:47

## 2022-01-01 RX ADMIN — Medication 50 MILLIEQUIVALENT(S): at 22:27

## 2022-01-01 RX ADMIN — PANTOPRAZOLE SODIUM 40 MILLIGRAM(S): 20 TABLET, DELAYED RELEASE ORAL at 06:04

## 2022-01-01 RX ADMIN — PANTOPRAZOLE SODIUM 80 MILLIGRAM(S): 20 TABLET, DELAYED RELEASE ORAL at 09:33

## 2022-01-01 RX ADMIN — PHENYLEPHRINE HYDROCHLORIDE 0.68 MICROGRAM(S)/KG/MIN: 10 INJECTION INTRAVENOUS at 19:24

## 2022-01-01 RX ADMIN — SODIUM CHLORIDE 80 MILLILITER(S): 9 INJECTION, SOLUTION INTRAVENOUS at 09:42

## 2022-01-01 RX ADMIN — Medication 1 MILLIGRAM(S): at 18:12

## 2022-01-01 RX ADMIN — Medication 50 MILLIEQUIVALENT(S): at 19:06

## 2022-01-01 RX ADMIN — LEVETIRACETAM 400 MILLIGRAM(S): 250 TABLET, FILM COATED ORAL at 19:16

## 2022-01-01 RX ADMIN — Medication 50 MILLILITER(S): at 05:07

## 2022-01-01 RX ADMIN — Medication 1 TABLET(S): at 05:26

## 2022-01-01 RX ADMIN — Medication 1 TABLET(S): at 20:57

## 2022-01-01 RX ADMIN — Medication 1 TABLET(S): at 00:08

## 2022-01-01 RX ADMIN — VASOPRESSIN 2.4 UNIT(S)/MIN: 20 INJECTION INTRAVENOUS at 22:05

## 2022-01-01 RX ADMIN — SODIUM CHLORIDE 100 MILLILITER(S): 9 INJECTION, SOLUTION INTRAVENOUS at 04:05

## 2022-01-01 RX ADMIN — Medication 50 MILLIEQUIVALENT(S): at 20:35

## 2022-01-01 RX ADMIN — Medication 100 MILLIGRAM(S): at 13:12

## 2022-01-01 RX ADMIN — CHLORHEXIDINE GLUCONATE 15 MILLILITER(S): 213 SOLUTION TOPICAL at 05:27

## 2022-01-01 RX ADMIN — Medication 25 GRAM(S): at 09:13

## 2022-01-01 RX ADMIN — SODIUM CHLORIDE 75 MILLILITER(S): 9 INJECTION, SOLUTION INTRAVENOUS at 22:11

## 2022-01-01 RX ADMIN — Medication 1 MILLIGRAM(S): at 13:05

## 2022-01-01 RX ADMIN — LEVETIRACETAM 400 MILLIGRAM(S): 250 TABLET, FILM COATED ORAL at 14:30

## 2022-01-01 RX ADMIN — PANTOPRAZOLE SODIUM 40 MILLIGRAM(S): 20 TABLET, DELAYED RELEASE ORAL at 05:27

## 2022-01-01 RX ADMIN — Medication 50 MICROGRAM(S): at 10:04

## 2022-01-01 RX ADMIN — VASOPRESSIN 2.4 UNIT(S)/MIN: 20 INJECTION INTRAVENOUS at 19:24

## 2022-01-01 RX ADMIN — PHENYLEPHRINE HYDROCHLORIDE 1.36 MICROGRAM(S)/KG/MIN: 10 INJECTION INTRAVENOUS at 18:51

## 2022-01-01 RX ADMIN — VASOPRESSIN 2.4 UNIT(S)/MIN: 20 INJECTION INTRAVENOUS at 13:01

## 2022-01-01 RX ADMIN — Medication 50 MILLILITER(S): at 07:00

## 2022-01-01 RX ADMIN — Medication 1 TABLET(S): at 17:01

## 2022-01-01 RX ADMIN — PHENYLEPHRINE HYDROCHLORIDE 0.68 MICROGRAM(S)/KG/MIN: 10 INJECTION INTRAVENOUS at 22:05

## 2022-01-01 RX ADMIN — DOPAMINE HYDROCHLORIDE 6.81 MICROGRAM(S)/KG/MIN: 40 INJECTION, SOLUTION, CONCENTRATE INTRAVENOUS at 09:18

## 2022-01-01 RX ADMIN — PANTOPRAZOLE SODIUM 40 MILLIGRAM(S): 20 TABLET, DELAYED RELEASE ORAL at 18:16

## 2022-01-01 RX ADMIN — Medication 50 MILLILITER(S): at 13:50

## 2022-01-01 RX ADMIN — SODIUM CHLORIDE 250 MILLILITER(S): 9 INJECTION, SOLUTION INTRAVENOUS at 11:08

## 2022-01-01 RX ADMIN — Medication 100 MILLIGRAM(S): at 13:10

## 2022-01-01 RX ADMIN — Medication 100 MILLIGRAM(S): at 14:32

## 2022-01-01 RX ADMIN — Medication 1.7 MICROGRAM(S)/KG/MIN: at 00:09

## 2022-01-01 RX ADMIN — MIDAZOLAM HYDROCHLORIDE 0.73 MG/KG/HR: 1 INJECTION, SOLUTION INTRAMUSCULAR; INTRAVENOUS at 18:51

## 2022-01-01 RX ADMIN — Medication 2 MILLIGRAM(S): at 15:59

## 2022-01-01 RX ADMIN — CHLORHEXIDINE GLUCONATE 15 MILLILITER(S): 213 SOLUTION TOPICAL at 18:16

## 2022-01-01 RX ADMIN — PHENYLEPHRINE HYDROCHLORIDE 1.36 MICROGRAM(S)/KG/MIN: 10 INJECTION INTRAVENOUS at 20:36

## 2022-01-01 RX ADMIN — Medication 100 MILLIGRAM(S): at 06:05

## 2022-01-01 RX ADMIN — Medication 50 MILLILITER(S): at 04:22

## 2022-01-01 RX ADMIN — PANTOPRAZOLE SODIUM 40 MILLIGRAM(S): 20 TABLET, DELAYED RELEASE ORAL at 18:17

## 2022-01-01 RX ADMIN — SODIUM CHLORIDE 2000 MILLILITER(S): 9 INJECTION, SOLUTION INTRAVENOUS at 08:10

## 2022-01-01 RX ADMIN — Medication 1.7 MICROGRAM(S)/KG/MIN: at 09:18

## 2022-01-01 RX ADMIN — Medication 50 MILLIEQUIVALENT(S): at 16:52

## 2022-01-01 RX ADMIN — Medication 100 MILLIGRAM(S): at 05:26

## 2022-01-01 RX ADMIN — LEVETIRACETAM 400 MILLIGRAM(S): 250 TABLET, FILM COATED ORAL at 11:46

## 2022-01-01 RX ADMIN — Medication 2 MILLIGRAM(S): at 14:05

## 2022-01-01 RX ADMIN — LEVETIRACETAM 400 MILLIGRAM(S): 250 TABLET, FILM COATED ORAL at 17:01

## 2022-01-01 RX ADMIN — PANTOPRAZOLE SODIUM 40 MILLIGRAM(S): 20 TABLET, DELAYED RELEASE ORAL at 18:50

## 2022-01-01 RX ADMIN — DOPAMINE HYDROCHLORIDE 6.81 MICROGRAM(S)/KG/MIN: 40 INJECTION, SOLUTION, CONCENTRATE INTRAVENOUS at 15:50

## 2022-01-01 RX ADMIN — Medication 1.7 MICROGRAM(S)/KG/MIN: at 04:21

## 2022-01-01 RX ADMIN — DOPAMINE HYDROCHLORIDE 6.81 MICROGRAM(S)/KG/MIN: 40 INJECTION, SOLUTION, CONCENTRATE INTRAVENOUS at 13:14

## 2022-01-01 RX ADMIN — MIDAZOLAM HYDROCHLORIDE 0.73 MG/KG/HR: 1 INJECTION, SOLUTION INTRAMUSCULAR; INTRAVENOUS at 06:04

## 2022-01-01 RX ADMIN — SODIUM CHLORIDE 75 MILLILITER(S): 9 INJECTION, SOLUTION INTRAVENOUS at 20:35

## 2022-01-01 RX ADMIN — Medication 50 MILLILITER(S): at 06:38

## 2022-01-01 RX ADMIN — DOPAMINE HYDROCHLORIDE 6.81 MICROGRAM(S)/KG/MIN: 40 INJECTION, SOLUTION, CONCENTRATE INTRAVENOUS at 02:51

## 2022-01-01 RX ADMIN — VASOPRESSIN 2.4 UNIT(S)/MIN: 20 INJECTION INTRAVENOUS at 18:51

## 2022-01-01 RX ADMIN — Medication 100 MILLILITER(S): at 16:47

## 2022-01-01 RX ADMIN — Medication 1.7 MICROGRAM(S)/KG/MIN: at 00:34

## 2022-01-01 RX ADMIN — Medication 102 MILLIGRAM(S): at 10:01

## 2022-01-01 RX ADMIN — Medication 250 MILLIGRAM(S): at 10:00

## 2022-01-01 RX ADMIN — CHLORHEXIDINE GLUCONATE 1 APPLICATION(S): 213 SOLUTION TOPICAL at 06:37

## 2022-01-01 RX ADMIN — DOPAMINE HYDROCHLORIDE 6.81 MICROGRAM(S)/KG/MIN: 40 INJECTION, SOLUTION, CONCENTRATE INTRAVENOUS at 19:23

## 2022-01-01 RX ADMIN — SODIUM CHLORIDE 1000 MILLILITER(S): 9 INJECTION INTRAMUSCULAR; INTRAVENOUS; SUBCUTANEOUS at 18:03

## 2022-01-01 RX ADMIN — LEVETIRACETAM 400 MILLIGRAM(S): 250 TABLET, FILM COATED ORAL at 18:15

## 2022-01-01 RX ADMIN — PHENYLEPHRINE HYDROCHLORIDE 0.68 MICROGRAM(S)/KG/MIN: 10 INJECTION INTRAVENOUS at 00:34

## 2022-01-01 RX ADMIN — DOPAMINE HYDROCHLORIDE 6.81 MICROGRAM(S)/KG/MIN: 40 INJECTION, SOLUTION, CONCENTRATE INTRAVENOUS at 20:36

## 2022-01-01 RX ADMIN — VASOPRESSIN 2.4 UNIT(S)/MIN: 20 INJECTION INTRAVENOUS at 00:37

## 2022-01-06 NOTE — ED PROCEDURE NOTE - CPROC ED POST PROC CARE GUIDE1
Verbal/written post procedure instructions were given to patient/caregiver./Instructed patient/caregiver to follow-up with primary care physician./Instructed patient/caregiver regarding signs and symptoms of infection./Keep the cast/splint/dressing clean and dry.
Verbal/written post procedure instructions were given to patient/caregiver.
Verbal/written post procedure instructions were given to patient/caregiver./Instructed patient/caregiver to follow-up with primary care physician./Instructed patient/caregiver regarding signs and symptoms of infection./Keep the cast/splint/dressing clean and dry.

## 2022-01-06 NOTE — CONSULT NOTE ADULT - SUBJECTIVE AND OBJECTIVE BOX
Patient is a 41y old  Female who presents with a chief complaint of cardiac arrest (06 Jan 2022 11:08)      HPI:  40 YO unvaccinated F has been sick for last 6 months as per  at bedside, loosing weight, hardly ambulates, doesn't bathe herself was up all night feeling worse one episode of diarrhea vomiting this am became unresponsive and   called EMS who found patient in asystole, started ACLS. Patient was hypoglycemic, received D50, epi x 5, bicarb and calcium. Went from asystole to PEA to sinus tach to sinus. In ED initial FS 36 COVID positive, HGB of 6.7 hypotensive on 3 pressors.    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      PAST MEDICAL & SURGICAL HISTORY:  No pertinent past medical history    No significant past surgical history        SOCIAL HX:   Smoking   -ve                      ETOH        -ve                    Other  -ve    FAMILY HISTORY:  :  No known cardiovacular family hisotry     ROS:  See HPI     Allergies    epinephrine (Headache)    Intolerances          PHYSICAL EXAM    ICU Vital Signs Last 24 Hrs  T(C): 31 (06 Jan 2022 10:34), Max: 31.1 (06 Jan 2022 09:20)  T(F): 87.8 (06 Jan 2022 10:34), Max: 88 (06 Jan 2022 09:20)  HR: 90 (06 Jan 2022 10:34) (72 - 92)  BP: 61/38 (06 Jan 2022 09:33) (59/35 - 135/58)  ABP: 55/36 (06 Jan 2022 10:34) (53/32 - 64/37)  RR: 16 (06 Jan 2022 10:34) (16 - 16)  SpO2: 98% (06 Jan 2022 10:34) (95% - 100%)      General: ill appearing, older than stated age. Intubated  HEENT:  MARIAH              Lungs: Bilateral BS, creathing over the vent  Cardiovascular: Regular  Gastrointestinal: Soft, Positive BS, mild tenderness  Musculoskeletal: nonpurposeful movement  Skin: Warm.  Neurological: intubated        LABS:                          6.7    8.18  )-----------( 167      ( 06 Jan 2022 08:30 )             21.7                                               01-06    133<L>  |  102  |  29<H>  ----------------------------<  93  3.4<L>   |  9<LL>  |  2.9<H>    Ca    8.0<L>      06 Jan 2022 08:30  Mg     2.8     01-06    TPro  4.0<L>  /  Alb  2.2<L>  /  TBili  0.3  /  DBili  x   /  AST  250<H>  /  ALT  101<H>  /  AlkPhos  34  01-06      PT/INR - ( 06 Jan 2022 08:30 )   PT: 23.10 sec;   INR: 2.02 ratio         PTT - ( 06 Jan 2022 08:30 )  PTT:61.5 sec                                           CARDIAC MARKERS ( 06 Jan 2022 08:30 )  x     / 0.05 ng/mL / x     / x     / x                                                LIVER FUNCTIONS - ( 06 Jan 2022 08:30 )  Alb: 2.2 g/dL / Pro: 4.0 g/dL / ALK PHOS: 34 U/L / ALT: 101 U/L / AST: 250 U/L / GGT: x                                                                                               Mode: AC/ CMV (Assist Control/ Continuous Mandatory Ventilation)  RR (machine): 16  TV (machine): 400  FiO2: 60  PEEP: 5  MAP: 9  PIP: 19                                      ABG - ( 06 Jan 2022 08:06 )  pH, Arterial: 7.14  pH, Blood: x     /  pCO2: 31    /  pO2: 494   / HCO3: 11    / Base Excess: -17.1 /  SaO2: 100.0               X-Rays                                                                                     ECHO    MEDICATIONS  (STANDING):  dextrose 5% 1000 milliLiter(s) (250 mL/Hr) IV Continuous <Continuous>  DOPamine Infusion 5 MICROgram(s)/kG/Min (6.81 mL/Hr) IV Continuous <Continuous>  norepinephrine Infusion 0.05 MICROgram(s)/kG/Min (1.7 mL/Hr) IV Continuous <Continuous>  phenylephrine    Infusion 0.1 MICROgram(s)/kG/Min (1.36 mL/Hr) IV Continuous <Continuous>      MEDICATIONS  (PRN):

## 2022-01-06 NOTE — ED PROCEDURE NOTE - CPROC ED INDICATIONS1
airway protection/critical patient/mental status change
arterial puncture to obtain ABG's/critical patient/monitoring purposes
emergency venous access/hypertonic/irritant infusion/volume resuscitation

## 2022-01-06 NOTE — CONSULT NOTE ADULT - ATTENDING COMMENTS
Patient on vent on multiple pressors. Not responsive. Suspect severe anoxic brain damage. Check labs cxr. Prognosis very grave Patient on vent on multiple pressors. Not responsive. Suspect severe anoxic brain damage. Check labs cxr. Rx for Rhabdomyolysis Prognosis very grave

## 2022-01-06 NOTE — ED PROVIDER NOTE - CRITICAL CARE ATTENDING CONTRIBUTION TO CARE
42 yo F, unknown history BIBEMS post cardiac arrest -- per  to EMS, patient stated she wasn't feeling well about 1.5 hours prior to collapsing. EMS found patient in asystole, started ACLS. Patient was hypoglycemic, received D50, epi x 5, bicarb and calcium. Went from asystole to PEA to sinus tach to sinus.     On arrival patient is breathing over the vent, no response to painful or verbal stimuli, pupils are fixed.    Labs, imaging when stable, central line, a line.

## 2022-01-06 NOTE — H&P ADULT - NSHPREVIEWOFSYSTEMS_GEN_ALL_CORE
CONSTITUTIONAL: yes weakness, no fevers or chills  EYES/ENT: No visual changes;  No vertigo or throat pain   NECK: No pain or stiffness  RESPIRATORY: No cough, wheezing, hemoptysis; No shortness of breath  CARDIOVASCULAR: No chest pain or palpitations  GASTROINTESTINAL: diarrhea and  vomiting x 1 this am  GENITOURINARY: No dysuria, frequency or hematuria  NEUROLOGICAL: No numbness or weakness  SKIN: No itching, rashes

## 2022-01-06 NOTE — ED PROVIDER NOTE - OBJECTIVE STATEMENT
40 yo F, unknown history BIBEMS post cardiac arrest -- per  to EMS, patient stated she wasn't feeling well about 1.5 hours prior to collapsing. EMS found patient in asystole, started ACLS. Patient was hypoglycemic, received D50, epi x 5, bicarb and calcium. Went from asystole to PEA to sinus tach to sinus.     On arrival patietn is breathing over the vent, no response to painful or verbal stimuli, pupils are fixed.

## 2022-01-06 NOTE — H&P ADULT - ASSESSMENT
42 YO unvaccinated F has been sick for last 6 months as per  at bedside, loosing weight, hardly ambulates, doesn't bathe herself was up all night feeling worse one episode of diarrhea vomiting this am became unresponsive and   called EMS who found patient in asystole, started ACLS. Patient was hypoglycemic, received D50, epi x 5, bicarb and calcium. Went from asystole to PEA to sinus tach to sinus. In ED initial FS 36 COVID positive, HGB of 6.7 hypotensive on 3 pressors 42 YO unvaccinated F has been sick for last 6 months as per  at bedside, loosing weight, hardly ambulates, doesn't bathe herself was up all night feeling worse one episode of diarrhea and  vomiting this am became unresponsive and   called EMS who found patient in asystole, started ACLS. Patient was hypoglycemic, received D50, epi x 5, bicarb and calcium. Went from asystole to PEA to sinus tach to sinus. In ED initial FS 36 COVID positive, HGB of 6.7 hypotensive on 3 pressors    cardiac arrest / acute respiratory failure / hypoglycemia / hypothermia / severe anemia / COVID-19 positive / hypotension with possible sepsis / acute kidney injury / metabolic acidosis     -  42 YO unvaccinated F has been sick for last 6 months as per  at bedside, loosing weight, hardly ambulates, doesn't bathe herself was up all night feeling worse one episode of diarrhea and  vomiting this am became unresponsive and   called EMS who found patient in asystole, started ACLS. Patient was hypoglycemic, received D50, epi x 5, bicarb and calcium. Went from asystole to PEA to sinus tach to sinus. In ED initial FS 36 COVID positive, HGB of 6.7 hypotensive on 3 pressors    cardiac arrest / acute respiratory failure / hypoglycemia / hypothermia / severe anemia / COVID-19 positive / hypotension with possible sepsis / acute kidney injury / metabolic acidosis     - D5W with 2amp bicarb [id number]   - Solu-cortef 100mg q8h   - pressor support 40 YO unvaccinated F has been sick for last 6 months as per  at bedside, loosing weight, hardly ambulates, doesn't bathe herself was up all night feeling worse one episode of diarrhea and  vomiting this am became unresponsive and   called EMS who found patient in asystole, started ACLS. Patient was hypoglycemic, received D50, epi x 5, bicarb and calcium. Went from asystole to PEA to sinus tach to sinus. In ED initial FS 36 COVID positive, HGB of 6.7 hypotensive on 3 pressors    cardiac arrest / acute respiratory failure / hypoglycemia / hypothermia / severe anemia / COVID-19 positive / hypotension with possible sepsis / acute kidney injury / metabolic acidosis     - D5W with 2amp bicarb at 250ml/hr   - Solu-cortef 100mg q8h   - pressor support   - cortisol, TSH   - received 2 units PRBC in ED - follow serial H/H   - received antibiotics in ED - consult ID for further management    - grave prognosis -  at bedside aware - he wishes FULL CODE   - pulm-CC, cardio, nephrology consults 42 YO unvaccinated F has been sick for last 6 months as per  at bedside, loosing weight, hardly ambulates, doesn't bathe herself was up all night feeling worse one episode of diarrhea and  vomiting this am became unresponsive and   called EMS who found patient in asystole, started ACLS. Patient was hypoglycemic, received D50, epi x 5, bicarb and calcium. Went from asystole to PEA to sinus tach to sinus. In ED initial FS 36 COVID positive, HGB of 6.7 hypotensive on 3 pressors    cardiac arrest / acute respiratory failure / hypoglycemia / hypothermia / severe anemia / COVID-19 positive / hypotension with possible sepsis / acute kidney injury / metabolic acidosis     - D5W with 2amp bicarb at 250ml/hr   - Solu-cortef 100mg q8h   - pressor support   - cortisol, TSH   - check FS q1h   - received 2 units PRBC in ED - follow serial H/H   - received antibiotics in ED - consult ID for further management    - grave prognosis -  at bedside aware - he wishes FULL CODE   - pulm-CC, cardio, nephrology consults

## 2022-01-06 NOTE — PATIENT PROFILE ADULT - FALL HARM RISK - HARM RISK INTERVENTIONS
Assistance OOB with selected safe patient handling equipment/Communicate Risk of Fall with Harm to all staff/Discuss with provider need for PT consult/Monitor gait and stability/Provide patient with walking aids - walker, cane, crutches/Reinforce activity limits and safety measures with patient and family/Review medications for side effects contributing to fall risk/Sit up slowly, dangle for a short time, stand at bedside before walking/Tailored Fall Risk Interventions/Toileting schedule using arm’s reach rule for commode and bathroom/Visual Cue: Yellow wristband and red socks/Bed in lowest position, wheels locked, appropriate side rails in place/Call bell, personal items and telephone in reach/Instruct patient to call for assistance before getting out of bed or chair/Non-slip footwear when patient is out of bed/[place] to call system/Physically safe environment - no spills, clutter or unnecessary equipment/Purposeful Proactive Rounding/Room/bathroom lighting operational, light cord in reach

## 2022-01-06 NOTE — ED PROVIDER NOTE - PHYSICAL EXAMINATION
VITAL SIGNS: I have reviewed nursing notes and confirm.  CONSTITUTIONAL: cachectic, appears chonicall ill  SKIN: Skin exam is warm and dry, no acute rash, extremities cool to touch  HEAD: Normocephalic; atraumatic.  EYES: pupils are non reactive  ENT: No nasal discharge; airway clear.   CARD: RRR,  RESP: No wheezes, rales or rhonchi, spontaneous BS  ABD: thin, soft, ND  NEURO: intubated, breathing over vent, no spontaneous movements otherwise

## 2022-01-06 NOTE — ED PROVIDER NOTE - CLINICAL SUMMARY MEDICAL DECISION MAKING FREE TEXT BOX
patient BIBEMS s/p cardiac arrest with prolonged resusc before ROSC in the field, per  has had chronic decline over last several months, in ED on multiple pressors, biting tube but not following commands, anemic wtihout evidence of bleeding, transfused, unstable for CT, admitted ICU

## 2022-01-06 NOTE — ED PROCEDURE NOTE - CPROC ED TIME OUT STATEMENT1
“Patient's name, , procedure and correct site were confirmed during the [place] Timeout.”
“Patient's name, , procedure and correct site were confirmed during the [facility] Timeout.”
“Patient's name, , procedure and correct site were confirmed during the [place] Timeout.”

## 2022-01-06 NOTE — ED PROVIDER NOTE - PROGRESS NOTE DETAILS
[facility]: post cardiac arrest of unclear etiology. CT head, labs, CXR, tox screen [facility]: glucose 36, D50 given, Combi tube changed to ETT, central line placed Melanie: case signed out to Dr. Wren

## 2022-01-06 NOTE — ED ADULT TRIAGE NOTE - CHIEF COMPLAINT QUOTE
BIBA with notification for post arrest at 0628.  Per EMS,  stated that pt woke up not feeling well and passed out. ALS performed by EMS-admin epi x 5, NaBicarb and Calcium.  Gave D50 for BG of 50.  Combitube in place with BVM

## 2022-01-06 NOTE — H&P ADULT - HISTORY OF PRESENT ILLNESS
42 YO unvaccinated F has been sick for last 6 months as per  at bedside, loosing weight, hardly ambulates, doesn't bathe herself was up all night feeling worse one episode of diarrhea vomiting this am became unresponsive and   called EMS who found patient in asystole, started ACLS. Patient was hypoglycemic, received D50, epi x 5, bicarb and calcium. Went from asystole to PEA to sinus tach to sinus. In ED initial FS 36 COVID positive, HGB of 6.7 hypotensive on 3 pressors 40 YO unvaccinated F has been sick for last 6 months as per  at bedside, loosing weight, hardly ambulates, doesn't bathe herself was up all night feeling worse one episode of diarrhea and  vomiting this am became unresponsive and   called EMS who found patient in asystole, started ACLS. Patient was hypoglycemic, received D50, epi x 5, bicarb and calcium. Went from asystole to PEA to sinus tach to sinus. In ED initial FS 36 COVID positive, HGB of 6.7 hypotensive on 3 pressors

## 2022-01-06 NOTE — CONSULT NOTE ADULT - SUBJECTIVE AND OBJECTIVE BOX
HPI:  40 YO unvaccinated F has been sick for last 6 months as per  at bedside, loosing weight, hardly ambulates, doesn't bathe herself was up all night feeling worse one episode of diarrhea and  vomiting this am became unresponsive and   called EMS who found patient in asystole, started ACLS. Patient was hypoglycemic, received D50, epi x 5, bicarb and calcium. Went from asystole to PEA to sinus tach to sinus. In ED initial FS 36 COVID positive, HGB of 6.7 hypotensive on 3 pressors (06 Jan 2022 11:08)    HPI-Cardiology   Pt evaluated at bedside. Currently admitted to ED hold, waiting for ICU bed. Radiology tests and hospital records, were reviewed, as well as previous notes on this patient. Pt is intubated, on multiple pressors, hyperthermia blanket in critical condition. Spoke to her  in person. He endorses weight loss, poor appetite over a year. After marrying him, pt's family rejected her since she  a non-Zoroastrianism man as per . According to  pt has been depressed (no official diagnosis of depression), refused to eat or socially interact with anybody.  denies any medical history. Has been diagnosed with "low cortisol level", noticed her skin was getting darker over few month but she was refusing to take any medication or seek medical attention. Pt was claiming she saw parasites in her stool ("long worms" as per ); stool negative in ed. Yesterday she was c/o of "not feeling feel", he then noticed "her eyes were looking through, it was no life" and he called 911. Pt was found hypoglycemic and asystolic by ems, ACLS protocol followed.  denies patient to complain about chest pain, palpitation, sob or dizziness prior arrest.      PAST MEDICAL & SURGICAL HISTORY   denies  adrenal insufficiency ?      FAMILY HISTORY:  unknown      SOCIAL HISTORY:  []smoker - denies  []Alcohol - denies  []Drug - denies    ALLERGIES:  epinephrine (Headache)      MEDICATIONS:  MEDICATIONS  (STANDING):  chlorhexidine 4% Liquid 1 Application(s) Topical <User Schedule>  cyanocobalamin Injectable 1000 MICROGram(s) IntraMuscular once  dextrose 5% 1000 milliLiter(s) (250 mL/Hr) IV Continuous <Continuous>  DOPamine Infusion 5 MICROgram(s)/kG/Min (6.81 mL/Hr) IV Continuous <Continuous>  folic acid Injectable 1 milliGRAM(s) IV Push once  hydrocortisone sodium succinate Injectable 100 milliGRAM(s) IV Push every 8 hours  norepinephrine Infusion 0.05 MICROgram(s)/kG/Min (1.7 mL/Hr) IV Continuous <Continuous>  phenylephrine    Infusion 0.1 MICROgram(s)/kG/Min (1.36 mL/Hr) IV Continuous <Continuous>  sodium bicarbonate  Injectable 50 milliEquivalent(s) IV Push once  sodium bicarbonate  Injectable 50 milliEquivalent(s) IV Push once  thiamine IVPB 500 milliGRAM(s) IV Intermittent once  vasopressin Infusion 0.04 Unit(s)/Min (2.4 mL/Hr) IV Continuous <Continuous>  MEDICATIONS  (PRN):      HOME MEDICATIONS:   denies      VITALS:   T(F): 93.9 (01-06 @ 13:30), Max: 93.9 (01-06 @ 13:30)  HR: 150 (01-06 @ 13:30) (72 - 150)  BP: 96/49 (01-06 @ 13:30) (59/35 - 135/58)  BP(mean): --  RR: 16 (01-06 @ 13:30) (16 - 16)  SpO2: 98% (01-06 @ 13:30) (95% - 100%)  I&O's Summary      REVIEW OF SYSTEMS:  unable to obtain    PHYSICAL EXAM:  NEURO: minimally responsive biding of ET tube, dilated fixed pupils b/l, myoclonal twitching   GEN: unkept poor hygiene, dirty and long finger nails  NECK: no thyroid enlargement, no JVD  LUNGS: intubated, b/l rhonchi,  no retraction  CARDIOVASCULAR: tachycardia, regular on pressors S1/S2 present, no murmurs or rubs, no carotid bruits,  + PP bilaterally  GI: coffee ground emesis, Soft, non-tender, non-distended, +BS  GI: veliz  EXT: No KENNEDI  SKIN: bronze color    A-line, central line    LABS:                        6.7    8.18  )-----------( 167      ( 06 Jan 2022 08:30 )             21.7   01-06  133<L>  |  102  |  29<H>  ----------------------------<  93  3.4<L>   |  9<LL>  |  2.9<H>  Ca    8.0<L>      06 Jan 2022 08:30  Phos  6.6     01-06  Mg     2.8     01-06  TPro  4.0<L>  /  Alb  2.2<L>  /  TBili  0.3  /  DBili  x   /  AST  250<H>  /  ALT  101<H>  /  AlkPhos  34  01-06  PT/INR - ( 06 Jan 2022 08:30 )   PT: 23.10 sec;   INR: 2.02 ratio    PTT - ( 06 Jan 2022 08:30 )  PTT:61.5 sec    Troponin T, Serum: 0.05 ng/mL *HH* (01-06-22 @ 08:30)  CARDIAC MARKERS ( 06 Jan 2022 08:30 )  x     / 0.05 ng/mL / x     / x     / x          RADIOLOGY:  CXR < from: Xray Chest 1 View AP/PA (01.06.22 @ 10:17) >  IMPRESSION:  ET tube is 4 to 5 cm above the matilda. NG tube is below left   hemidiaphragm out of the field-of-view. Unchanged left IJ venous catheter.  Increased bilateral perihilar opacities. No pneumothorax.  Stable cardiomediastinal silhouette. Unchanged osseous structures.  --- End of Report ---  < end of copied text >    ECG < from: 12 Lead ECG (01.06.22 @ 06:38) >  Diagnosis Line Normal sinus rhythm  Nonspecific ST abnormality  < end of copied text >

## 2022-01-06 NOTE — H&P ADULT - NSHPLABSRESULTS_GEN_ALL_CORE
LABS  01-06      POCT Blood Glucose (01.06.22 @ 06:33)   POCT Blood Glucose.: 36 mg/dL       133<L>  |  102  |  29<H>  ----------------------------<  93  3.4<L>   |  9<LL>  |  2.9<H>    Ca    8.0<L>      06 Jan 2022 08:30  Mg     2.8     01-06    TPro  4.0<L>  /  Alb  2.2<L>  /  TBili  0.3  /  DBili  x   /  AST  250<H>  /  ALT  101<H>  /  AlkPhos  34  01-06                          6.7    8.18  )-----------( 167      ( 06 Jan 2022 08:30 )             21.7       PT/INR - ( 06 Jan 2022 08:30 )   PT: 23.10 sec;   INR: 2.02 ratio         PTT - ( 06 Jan 2022 08:30 )  PTT:61.5 sec  Mode: AC/ CMV (Assist Control/ Continuous Mandatory Ventilation)  RR (machine): 16  TV (machine): 400  FiO2: 60  PEEP: 5    CARDIAC ENZYMES    Troponin T, Serum (01.06.22 @ 08:30)   Troponin T, Serum: 0.05: Critical value:       COVID-19 PCR (01.06.22 @ 06:45)   COVID-19 PCR: Detected    CXR:    Impression:    No focal consolidation, pneumothorax or pleural effusion.    < end of copied text >    EKG:    Diagnosis Line - Normal sinus rhythm  Nonspecific ST abnormality    Blood Gas Profile - Arterial (01.06.22 @ 08:06)   pH, Arterial: 7.14  pCO2, Arterial: 31  pO2, Arterial: 494  HCO3, Arterial: 11    Blood Gas Arterial, Lactate (01.06.22 @ 08:06)   Blood Gas Arterial, Lactate: 9.90:

## 2022-01-06 NOTE — CONSULT NOTE ADULT - ASSESSMENT
IMPRESSION:    Cardiopulmonary arrest(nonshockable)  Hypoglycemia   COVID +ve  Major depression  Severe KELSEA  Hypokalemia  Severe Malnutrition  Severe Anemia(no evidence of active bleed)  Lactic acidosis    PLAN:    CNS: CT head when more stable. Avoid CNS depressants, keppra, check VEEG, UDS/SDS  Start thiamine 300q8, folate, MV, Psych eval  Check vitamin b12 levels, check homocysteine    HEENT:  Oral care    PULMONARY:  HOB @ 45 degrees. wean Fio2 to 50%, Check ABGs in 2 hrs  Monitor plateau and driving pressure  keep sats 94-97%    CARDIOVASCULAR: Check 2d echo, LE duplex, probnp, repeat 1 more set of trops  add vasopressin. Continue vasoactive meds, Bicarb drip at 150cc/hr, bolus 100meqs IV push.    GI: GI prophylaxis                                          Feeding start oral feeds, nutrition eval    RENAL:  F/u  lytes.  Correct as needed. accurate I/O, Check ipo4, continue bicarb drip.    INFECTIOUS DISEASE: S/p 1 dose of cefepime and vanc in ED, F/u culture, procal, continue abx.     HEMATOLOGICAL:  DVT prophylaxis(SCD for now), vitamin k 1 mg IV, transfuse 2 units of PRBCs, repeat CBC.    ENDOCRINE:  Follow up FS. HC 100q8. check a1c, TSH level.    MUSCULOSKELETAL: Bed rest for now    CODE STATUS: FULL CODE    DISPOSITION: Pt requires continued monitoring in the MICU    Poor prognosis   IMPRESSION:    Cardiopulmonary arrest(nonshockable)  Hypoglycemia   COVID +ve  Septic shock  Sepsis present on admission  Major depression  Severe KELSEA  Hypokalemia  Severe Malnutrition  Severe Anemia(no evidence of active bleed)  Lactic acidosis    PLAN:    CNS: CT head when more stable. Avoid CNS depressants, keppra, check VEEG, UDS/SDS  Start thiamine 300q8, folate, MV, Psych eval  Check vitamin b12 levels, check homocysteine    HEENT:  Oral care    PULMONARY:  HOB @ 45 degrees. wean Fio2 to 50%, Check ABGs in 2 hrs  Monitor plateau and driving pressure  keep sats 94-97%    CARDIOVASCULAR: Check 2d echo, LE duplex, probnp, repeat 1 more set of trops  add vasopressin. Continue vasoactive meds, Bicarb drip at 150cc/hr, bolus 100meqs IV push.    GI: GI prophylaxis                                          Feeding start oral feeds, nutrition eval    RENAL:  F/u  lytes.  Correct as needed. accurate I/O, Check ipo4, continue bicarb drip.    INFECTIOUS DISEASE: S/p 1 dose of cefepime and vanc in ED, F/u culture, procal, continue abx.     HEMATOLOGICAL:  DVT prophylaxis(SCD for now), vitamin k 1 mg IV, transfuse 2 units of PRBCs, repeat CBC.    ENDOCRINE:  Follow up FS. HC 100q8. check a1c, TSH level.    MUSCULOSKELETAL: Bed rest for now    CODE STATUS: FULL CODE    DISPOSITION: Pt requires continued monitoring in the MICU    Poor prognosis

## 2022-01-06 NOTE — H&P ADULT - NSHPPHYSICALEXAM_GEN_ALL_CORE
PHYSICAL EXAM:  Vital Signs Last 24 Hrs  T(C): 31 (06 Jan 2022 10:34), Max: 31.1 (06 Jan 2022 09:20)  T(F): 87.8 (06 Jan 2022 10:34), Max: 88 (06 Jan 2022 09:20)  HR: 90 (06 Jan 2022 10:34) (72 - 92)  BP: 61/38 (06 Jan 2022 09:33) (59/35 - 135/58)  RR: 16 (06 Jan 2022 10:34) (16 - 16)    SpO2: 98% (06 Jan 2022 10:34) (95% - 100%)  GENERAL: off sedation on vent - minimally responsive to painful stimuli  HEAD:  Atraumatic, Normocephalic  NECK: Supple, No JVD, Normal thyroid  NERVOUS SYSTEM:  positive gag  CHEST/LUNG: Clear to percussion bilaterally; No rales, rhonchi, wheezing, or rubs  HEART: Regular rate and rhythm; No murmurs, rubs, or gallops  ABDOMEN: Soft, Nontender, Nondistended; Bowel sounds present  EXTREMITIES:  2+ Peripheral Pulses, No clubbing, cyanosis, or edema  LYMPH: No lymphadenopathy noted  SKIN: No rashes or lesions

## 2022-01-06 NOTE — ED ADULT NURSE REASSESSMENT NOTE - NS ED NURSE REASSESS COMMENT FT1
Sign out received from GIOVANNY Lima. Pt maintained on all meds per MAR. Sign out received from GIOVANNY Lima. Pt maintained on all meds per MAR.    1800 - Bedside handoff completed with ICU GIOVANNY Amaral.

## 2022-01-06 NOTE — CONSULT NOTE ADULT - ASSESSMENT
42yo female pt with no known medical history biba port cardiac arrest and hypoglycemia Admitted to ED hold, needs ICU/CCU bed. Weight loss, poor appetite over a year, poor hygiene, dirt under finger nails, unwilling to seek medical attention Diagnosed with adrenal insufficiency but not taking any medications. Delusion saw parasites ("long worms") in her stool - negative in ed. Was hypoglycemic on a keith FS 39, arrested and regained ROSC prior getting to the hospital Covid +. GI bleed, coffee ground emesis. Anemia needs transfusion. Poor prognosis    Plan:  * cardiac arrest  - intubated, on multiple pressors, A-line  - needs critical care  - continuos telemetry  - trop 0.05, pending repeat  - will need echo  - ck/ckmb, lipids, tsh  - keep AICD pads on  - no ADHF, no fluid overload on xray    * GI bleed/amenia  - protonix  - hgb 6.7 -> 2units pending  - GI eval  - NGT to wall suction for decompression    * edwige  - trend bmp  - fluids  - replete lytes    * hypoglycemia/adrenal insufficiency  bronze color skin, officially diagnosed, not treated  - FS  - endocrine eval  - cortisol level    *sepsis  r/t covid and overall deterioration  - fluids  - procalc  - antibiotics  - ID eval  - UA 40yo female pt with no known medical history biba port cardiac arrest and hypoglycemia Admitted to ED hold, needs ICU/CCU bed. Weight loss, poor appetite over a year, poor hygiene, dirt under finger nails, unwilling to seek medical attention Diagnosed with adrenal insufficiency but not taking any medications. Delusion saw parasites ("long worms") in her stool - negative in ed. Was hypoglycemic on a keith FS 39, arrested and regained ROSC prior getting to the hospital Covid +. GI bleed, coffee ground emesis. Anemia needs transfusion. Poor prognosis    Plan:  * cardiac arrest  - intubated, on multiple pressors, A-line  - needs critical care  - continuos telemetry  - trop 0.05, pending repeat  - will need echo  - ck/ckmb, lipids, tsh  - keep defib pads on  - no ADHF, no fluid overload on xray    * GI bleed/amenia  - protonix  - hgb 6.7 -> 2units pending  - GI eval  - NGT to wall suction for decompression    * edwige  - trend bmp  - fluids  - replete lytes    * hypoglycemia/adrenal insufficiency  bronze color skin, officially diagnosed, not treated  - FS  - endocrine eval  - cortisol level    *sepsis  r/t covid and overall deterioration  - fluids  - procalc  - antibiotics  - ID eval  - UA

## 2022-01-07 NOTE — PROGRESS NOTE ADULT - SUBJECTIVE AND OBJECTIVE BOX
LENGTH OF HOSPITAL STAY: 1d    CHIEF COMPLAINT:   Patient is a 41y old  Female who presents with a chief complaint of cardiac arrest (07 Jan 2022 11:58)      Overnight events:    No acute events overnight    ALLERGIES:  epinephrine (Headache)    MEDICATIONS:  STANDING MEDICATIONS  chlorhexidine 0.12% Liquid 15 milliLiter(s) Oral Mucosa two times a day  chlorhexidine 4% Liquid 1 Application(s) Topical <User Schedule>  cyanocobalamin Injectable 1000 MICROGram(s) IntraMuscular once  dextrose 5% 1000 milliLiter(s) IV Continuous <Continuous>  DOPamine Infusion 5 MICROgram(s)/kG/Min IV Continuous <Continuous>  folic acid Injectable 1 milliGRAM(s) IV Push once  folic acid Injectable 1 milliGRAM(s) IV Push daily  hydrocortisone sodium succinate Injectable 100 milliGRAM(s) IV Push every 8 hours  levETIRAcetam  IVPB 1000 milliGRAM(s) IV Intermittent every 12 hours  midazolam Infusion 0.02 mG/kG/Hr IV Continuous <Continuous>  midazolam Injectable 2 milliGRAM(s) IV Push once  norepinephrine Infusion 0.05 MICROgram(s)/kG/Min IV Continuous <Continuous>  pantoprazole  Injectable 40 milliGRAM(s) IV Push two times a day  phenylephrine    Infusion 0.1 MICROgram(s)/kG/Min IV Continuous <Continuous>  sodium bicarbonate  Injectable 50 milliEquivalent(s) IV Push once  thiamine IVPB 300 milliGRAM(s) IV Intermittent every 8 hours  thiamine IVPB 500 milliGRAM(s) IV Intermittent once  vasopressin Infusion 0.04 Unit(s)/Min IV Continuous <Continuous>    PRN MEDICATIONS    VITALS:   T(F): 98.4  HR: 126  BP: 95/66  RR: 19  SpO2: 97%    LABS:                        12.7   31.82 )-----------( 170      ( 07 Jan 2022 06:35 )             37.5     01-07    132<L>  |  97<L>  |  24<H>  ----------------------------<  284<H>  4.2   |  17  |  1.8<H>    Ca    6.0<L>      07 Jan 2022 06:35  Phos  5.2     01-07  Mg     1.9     01-07    TPro  4.3<L>  /  Alb  2.1<L>  /  TBili  0.6  /  DBili  0.4<H>  /  AST  386<H>  /  ALT  155<H>  /  AlkPhos  46  01-07    PT/INR - ( 07 Jan 2022 06:35 )   PT: 32.10 sec;   INR: 2.82 ratio         PTT - ( 07 Jan 2022 06:35 )  PTT:47.1 sec    ABG - ( 07 Jan 2022 03:53 )  pH, Arterial: 7.30  pH, Blood: x     /  pCO2: 38    /  pO2: 104   / HCO3: 19    / Base Excess: -7.1  /  SaO2: 98.1              Lactate, Blood: 5.1 mmol/L *HH* (01-07-22 @ 06:35)  Troponin T, Serum: 0.10 ng/mL *HH* (01-07-22 @ 06:35)  Creatine Kinase, Serum: 34765 U/L *H* (01-07-22 @ 06:35)  Lactate, Blood: 3.8 mmol/L *H* (01-06-22 @ 21:49)  Troponin T, Serum: 0.06 ng/mL *HH* (01-06-22 @ 21:49)  Lactate, Blood: 8.3 mmol/L *HH* (01-06-22 @ 18:00)  Troponin T, Serum: 0.10 ng/mL *HH* (01-06-22 @ 18:00)  Creatine Kinase, Serum: 22804 U/L *H* (01-06-22 @ 18:00)      CARDIAC MARKERS ( 07 Jan 2022 06:35 )  x     / 0.10 ng/mL / 30956 U/L / x     / x      CARDIAC MARKERS ( 06 Jan 2022 21:49 )  x     / 0.06 ng/mL / x     / x     / x      CARDIAC MARKERS ( 06 Jan 2022 18:00 )  x     / 0.10 ng/mL / 15401 U/L / x     / x      CARDIAC MARKERS ( 06 Jan 2022 08:30 )  x     / 0.05 ng/mL / x     / x     / x            Cultures:      RADIOLOGY:    PHYSICAL EXAM:  General: Sedated  HEENT: sluggish pupillary reflexes            Lungs: Bilateral BS  Cardiovascular: Regular   Abdomen: Soft, Positive BS  Extremities: No clubbing   Skin: Warm  Neurological: sedated, +ve gag

## 2022-01-07 NOTE — CONSULT NOTE ADULT - SUBJECTIVE AND OBJECTIVE BOX
PETECASS  41y, Female  Allergy: epinephrine (Headache)      CHIEF COMPLAINT: cardiac arrest (06 Jan 2022 13:45)      LOS  1d    HPI:  42 YO unvaccinated F has been sick for last 6 months as per  at bedside, loosing weight, hardly ambulates, doesn't bathe herself was up all night feeling worse one episode of diarrhea and  vomiting this am became unresponsive and   called EMS who found patient in asystole, started ACLS. Patient was hypoglycemic, received D50, epi x 5, bicarb and calcium. Went from asystole to PEA to sinus tach to sinus. In ED initial FS 36 COVID positive, HGB of 6.7 hypotensive on 3 pressors (06 Jan 2022 11:08)      INFECTIOUS DISEASE HISTORY:  History as above.   ID consulted for sepsis, COVID management.   S/p PEA arrest  Patient intubated, on pressors    PAST MEDICAL & SURGICAL HISTORY:  No pertinent past medical history    No significant past surgical history        FAMILY HISTORY  Unable to obtain     SOCIAL HISTORY  Social History:    no alcohol, illicit drugs or cigarette use (06 Jan 2022 11:08)        ROS  General: Denies rigors, nightsweats  HEENT: Denies headache, rhinorrhea, sore throat, eye pain  CV: Denies CP, palpitations  PULM: Denies wheezing, hemoptysis  GI: Denies hematemesis, hematochezia, melena  : Denies discharge, hematuria  MSK: Denies arthralgias, myalgias  SKIN: Denies rash, lesions  NEURO: Denies paresthesias, weakness  PSYCH: Denies depression, anxiety    VITALS:  T(F): 94.5, Max: 99.5 (01-06-22 @ 17:42)  HR: 97  BP: 93/62  RR: 16Vital Signs Last 24 Hrs  T(C): 34.7 (07 Jan 2022 07:02), Max: 37.5 (06 Jan 2022 17:42)  T(F): 94.5 (07 Jan 2022 07:02), Max: 99.5 (06 Jan 2022 17:42)  HR: 97 (07 Jan 2022 06:00) (77 - 150)  BP: 93/62 (06 Jan 2022 18:20) (59/35 - 156/124)  BP(mean): 71 (06 Jan 2022 18:20) (71 - 133)  RR: 16 (07 Jan 2022 07:02) (16 - 138)  SpO2: 98% (07 Jan 2022 05:30) (87% - 100%)    PHYSICAL EXAM:  Gen: NAD, resting in bed  HEENT: Normocephalic, atraumatic  Neck: supple, no lymphadenopathy  CV: Regular rate & regular rhythm  Lungs: decreased BS at bases, no fremitus  Abdomen: Soft, BS present  Ext: Warm, well perfused  Neuro: non focal, awake  Skin: no rash, no erythema  Lines: no phlebitis    TESTS & MEASUREMENTS:                        12.7   31.82 )-----------( 170      ( 07 Jan 2022 06:35 )             37.5     01-07    132<L>  |  97<L>  |  24<H>  ----------------------------<  284<H>  4.2   |  17  |  1.8<H>    Ca    6.0<L>      07 Jan 2022 06:35  Phos  5.2     01-07  Mg     1.9     01-07    TPro  4.3<L>  /  Alb  2.1<L>  /  TBili  0.6  /  DBili  x   /  AST  386<H>  /  ALT  155<H>  /  AlkPhos  46  01-07    eGFR if Non African American: 34 mL/min/1.73M2 (01-07-22 @ 06:35)  eGFR if : 39 mL/min/1.73M2 (01-07-22 @ 06:35)  eGFR if Non African American: 27 mL/min/1.73M2 (01-06-22 @ 21:49)  eGFR if : 31 mL/min/1.73M2 (01-06-22 @ 21:49)  eGFR if Non African American: 24 mL/min/1.73M2 (01-06-22 @ 18:00)  eGFR if : 28 mL/min/1.73M2 (01-06-22 @ 18:00)  eGFR if Non African American: 19 mL/min/1.73M2 (01-06-22 @ 08:30)  eGFR if : 22 mL/min/1.73M2 (01-06-22 @ 08:30)    LIVER FUNCTIONS - ( 07 Jan 2022 06:35 )  Alb: 2.1 g/dL / Pro: 4.3 g/dL / ALK PHOS: 46 U/L / ALT: 155 U/L / AST: 386 U/L / GGT: x                 Lactate, Blood: 5.1 mmol/L (01-07-22 @ 06:35)  Lactate, Blood: 3.8 mmol/L (01-06-22 @ 21:49)  Lactate, Blood: 8.3 mmol/L (01-06-22 @ 18:00)      INFECTIOUS DISEASES TESTING  HIV-1/2 Combo Result: Nonreact (01-06-22 @ 08:30)  COVID-19 PCR: Detected (01-06-22 @ 06:45)      RADIOLOGY & ADDITIONAL TESTS:  I have personally reviewed the last Chest xray  CXR  Xray Chest 1 View AP/PA:   ACC: [id number] EXAM:  XR CHEST 1 VIEW                          PROCEDURE DATE:  01/06/2022          INTERPRETATION:  STUDY INDICATION: et tube adjustment    TECHNIQUE:  Portable frontal view of the chest obtained.    COMPARISON: 1/6/2022.    FINDINGS/  IMPRESSION:    ET tube is 4 to 5 cm above the matilda. NG tube is below left   hemidiaphragm out of the field-of-view. Unchanged left IJ venous catheter.    Increased bilateral perihilar opacities. No pneumothorax.    Stable cardiomediastinal silhouette. Unchanged osseous structures.    --- End of Report ---            RAINER PALMA MD; Attending Radiologist  This document has been electronically signed. Jan 6 2022 12:37PM (01-06-22 @ 10:17)      CT      CARDIOLOGY TESTING  12 Lead ECG:   Ventricular Rate 145 BPM    Atrial Rate 145 BPM    P-R Interval 130 ms    QRS Duration 80 ms    Q-T Interval 264 ms    QTC Calculation(Bazett) 410 ms    P Axis 75 degrees    R Axis 83 degrees    T Axis 60 degrees    Diagnosis Line Sinus tachycardiawith Premature supraventricular complexes  Low voltage QRS  Nonspecific T wave abnormality  Abnormal ECG    Confirmed by YOVANI CLINE MD ([id number]) on 1/6/2022 6:03:19 PM (01-06-22 @ 14:45)  12 Lead ECG:   Ventricular Rate 95 BPM    Atrial Rate 95 BPM    P-R Interval 134 ms    QRS Duration 74 ms    Q-T Interval 420 ms    QTC Calculation(Bazett) 527 ms    P Axis 77 degrees    R Axis 75 degrees    T Axis 76 degrees    Diagnosis Line Normal sinus rhythm  Nonspecific ST abnormality      Confirmed by YOVANI CLINE MD ([id number]) on 1/6/2022 11:22:42 AM (01-06-22 @ 06:38)      MEDICATIONS  chlorhexidine 4% Liquid 1 Topical <User Schedule>  cyanocobalamin Injectable 1000 IntraMuscular once  dextrose 5% 1000 IV Continuous <Continuous>  DOPamine Infusion 5 IV Continuous <Continuous>  folic acid Injectable 1 IV Push once  hydrocortisone sodium succinate Injectable 100 IV Push every 8 hours  levETIRAcetam  IVPB 500 IV Intermittent every 12 hours  midazolam Infusion 0.02 IV Continuous <Continuous>  midazolam Injectable 2 IV Push once  norepinephrine Infusion 0.05 IV Continuous <Continuous>  pantoprazole  Injectable 40 IV Push two times a day  phenylephrine    Infusion 0.1 IV Continuous <Continuous>  sodium bicarbonate  Injectable 50 IV Push once  thiamine IVPB 500 IV Intermittent once  vasopressin Infusion 0.04 IV Continuous <Continuous>      Weight  Weight (kg): 36.3 (01-06-22 @ 06:34)    ANTIBIOTICS:      ALLERGIES:  epinephrine (Headache)      ASSESSMENT  42 YO unvaccinated F has been sick for last 6 months as per  at bedside, loosing weight, hardly ambulates who presents for unresponsiveness, found by EMS to be in PEA arrest     IMPRESSION  #PEA arrest   #Shock, septic/cardiogenic  #Severe Anemia  #Hypoglycemia  #Transaminitis  #COVID-19 - unvaccinated  #Abx allergy: epinephrine (Headache)    RECOMMENDATIONS  This is a preliminary incomplete pended note, all final recommendations to follow after interview and examination of the patient.    Please call or message on [employer] Teams if with any questions.  Spectra [phone]     CASS FREEMAN  41y, Female  Allergy: epinephrine (Headache)      CHIEF COMPLAINT: cardiac arrest (06 Jan 2022 13:45)      LOS  1d    HPI:  42 YO unvaccinated F has been sick for last 6 months as per  at bedside, loosing weight, hardly ambulates, doesn't bathe herself was up all night feeling worse one episode of diarrhea and  vomiting this am became unresponsive and   called EMS who found patient in asystole, started ACLS. Patient was hypoglycemic, received D50, epi x 5, bicarb and calcium. Went from asystole to PEA to sinus tach to sinus. In ED initial FS 36 COVID positive, HGB of 6.7 hypotensive on 3 pressors (06 Jan 2022 11:08)      INFECTIOUS DISEASE HISTORY:  History as above.   ID consulted for sepsis, COVID management.   S/p PEA arrest  Patient intubated, on pressors    Discussed with .   She has been sick for past 6 months with weight lost, lethargy.   She has been seen by other providers for back pain and possible parasites in stool.   She reportedly had MRI of her spine done which showed no acute pathology; Was recommended for colonoscopy but patient refused.   She is vegetarian,  is concerned that she has not been eating well.   Denies any recent travel in past 2 years.   No reports of fevers, chills.   Her skin has been become more bronze/brown over the past few months.     PAST MEDICAL & SURGICAL HISTORY:  No pertinent past medical history    No significant past surgical history        FAMILY HISTORY  Unable to obtain     SOCIAL HISTORY  Social History:    no alcohol, illicit drugs or cigarette use (06 Jan 2022 11:08)        ROS  General: Denies rigors, nightsweats  HEENT: Denies headache, rhinorrhea, sore throat, eye pain  CV: Denies CP, palpitations  PULM: Denies wheezing, hemoptysis  GI: Denies hematemesis, hematochezia, melena  : Denies discharge, hematuria  MSK: Denies arthralgias, myalgias  SKIN: Denies rash, lesions  NEURO: Denies paresthesias, weakness  PSYCH: Denies depression, anxiety    VITALS:  T(F): 94.5, Max: 99.5 (01-06-22 @ 17:42)  HR: 97  BP: 93/62  RR: 16Vital Signs Last 24 Hrs  T(C): 34.7 (07 Jan 2022 07:02), Max: 37.5 (06 Jan 2022 17:42)  T(F): 94.5 (07 Jan 2022 07:02), Max: 99.5 (06 Jan 2022 17:42)  HR: 97 (07 Jan 2022 06:00) (77 - 150)  BP: 93/62 (06 Jan 2022 18:20) (59/35 - 156/124)  BP(mean): 71 (06 Jan 2022 18:20) (71 - 133)  RR: 16 (07 Jan 2022 07:02) (16 - 138)  SpO2: 98% (07 Jan 2022 05:30) (87% - 100%)    PHYSICAL EXAM:  Gen: NAD, resting in bed  HEENT: Normocephalic, atraumatic  Neck: supple, no lymphadenopathy  CV: Regular rate & regular rhythm  Lungs: decreased BS at bases, no fremitus  Abdomen: Soft, BS present  Ext: Warm, well perfused  Neuro: non focal, awake  Skin: no rash, no erythema  Lines: no phlebitis    TESTS & MEASUREMENTS:                        12.7   31.82 )-----------( 170      ( 07 Jan 2022 06:35 )             37.5     01-07    132<L>  |  97<L>  |  24<H>  ----------------------------<  284<H>  4.2   |  17  |  1.8<H>    Ca    6.0<L>      07 Jan 2022 06:35  Phos  5.2     01-07  Mg     1.9     01-07    TPro  4.3<L>  /  Alb  2.1<L>  /  TBili  0.6  /  DBili  x   /  AST  386<H>  /  ALT  155<H>  /  AlkPhos  46  01-07    eGFR if Non African American: 34 mL/min/1.73M2 (01-07-22 @ 06:35)  eGFR if : 39 mL/min/1.73M2 (01-07-22 @ 06:35)  eGFR if Non African American: 27 mL/min/1.73M2 (01-06-22 @ 21:49)  eGFR if : 31 mL/min/1.73M2 (01-06-22 @ 21:49)  eGFR if Non African American: 24 mL/min/1.73M2 (01-06-22 @ 18:00)  eGFR if : 28 mL/min/1.73M2 (01-06-22 @ 18:00)  eGFR if Non African American: 19 mL/min/1.73M2 (01-06-22 @ 08:30)  eGFR if : 22 mL/min/1.73M2 (01-06-22 @ 08:30)    LIVER FUNCTIONS - ( 07 Jan 2022 06:35 )  Alb: 2.1 g/dL / Pro: 4.3 g/dL / ALK PHOS: 46 U/L / ALT: 155 U/L / AST: 386 U/L / GGT: x                 Lactate, Blood: 5.1 mmol/L (01-07-22 @ 06:35)  Lactate, Blood: 3.8 mmol/L (01-06-22 @ 21:49)  Lactate, Blood: 8.3 mmol/L (01-06-22 @ 18:00)      INFECTIOUS DISEASES TESTING  HIV-1/2 Combo Result: Nonreact (01-06-22 @ 08:30)  COVID-19 PCR: Detected (01-06-22 @ 06:45)      RADIOLOGY & ADDITIONAL TESTS:  I have personally reviewed the last Chest xray  CXR  Xray Chest 1 View AP/PA:   ACC: [id number] EXAM:  XR CHEST 1 VIEW                          PROCEDURE DATE:  01/06/2022          INTERPRETATION:  STUDY INDICATION: et tube adjustment    TECHNIQUE:  Portable frontal view of the chest obtained.    COMPARISON: 1/6/2022.    FINDINGS/  IMPRESSION:    ET tube is 4 to 5 cm above the matilda. NG tube is below left   hemidiaphragm out of the field-of-view. Unchanged left IJ venous catheter.    Increased bilateral perihilar opacities. No pneumothorax.    Stable cardiomediastinal silhouette. Unchanged osseous structures.    --- End of Report ---            RAINER PALMA MD; Attending Radiologist  This document has been electronically signed. Jan 6 2022 12:37PM (01-06-22 @ 10:17)      CT      CARDIOLOGY TESTING  12 Lead ECG:   Ventricular Rate 145 BPM    Atrial Rate 145 BPM    P-R Interval 130 ms    QRS Duration 80 ms    Q-T Interval 264 ms    QTC Calculation(Bazett) 410 ms    P Axis 75 degrees    R Axis 83 degrees    T Axis 60 degrees    Diagnosis Line Sinus tachycardiawith Premature supraventricular complexes  Low voltage QRS  Nonspecific T wave abnormality  Abnormal ECG    Confirmed by YOVANI CLINE MD ([id number]) on 1/6/2022 6:03:19 PM (01-06-22 @ 14:45)  12 Lead ECG:   Ventricular Rate 95 BPM    Atrial Rate 95 BPM    P-R Interval 134 ms    QRS Duration 74 ms    Q-T Interval 420 ms    QTC Calculation(Bazett) 527 ms    P Axis 77 degrees    R Axis 75 degrees    T Axis 76 degrees    Diagnosis Line Normal sinus rhythm  Nonspecific ST abnormality      Confirmed by YOVANI CLINE MD [id number]) on 1/6/2022 11:22:42 AM (01-06-22 @ 06:38)      MEDICATIONS  chlorhexidine 4% Liquid 1 Topical <User Schedule>  cyanocobalamin Injectable 1000 IntraMuscular once  dextrose 5% 1000 IV Continuous <Continuous>  DOPamine Infusion 5 IV Continuous <Continuous>  folic acid Injectable 1 IV Push once  hydrocortisone sodium succinate Injectable 100 IV Push every 8 hours  levETIRAcetam  IVPB 500 IV Intermittent every 12 hours  midazolam Infusion 0.02 IV Continuous <Continuous>  midazolam Injectable 2 IV Push once  norepinephrine Infusion 0.05 IV Continuous <Continuous>  pantoprazole  Injectable 40 IV Push two times a day  phenylephrine    Infusion 0.1 IV Continuous <Continuous>  sodium bicarbonate  Injectable 50 IV Push once  thiamine IVPB 500 IV Intermittent once  vasopressin Infusion 0.04 IV Continuous <Continuous>      Weight  Weight (kg): 36.3 (01-06-22 @ 06:34)    ANTIBIOTICS:      ALLERGIES:  epinephrine (Headache)         CASS FREEMAN  41y, Female  Allergy: epinephrine (Headache)      CHIEF COMPLAINT: cardiac arrest (06 Jan 2022 13:45)      LOS  1d    HPI:  42 YO unvaccinated F has been sick for last 6 months as per  at bedside, loosing weight, hardly ambulates, doesn't bathe herself was up all night feeling worse one episode of diarrhea and  vomiting this am became unresponsive and   called EMS who found patient in asystole, started ACLS. Patient was hypoglycemic, received D50, epi x 5, bicarb and calcium. Went from asystole to PEA to sinus tach to sinus. In ED initial FS 36 COVID positive, HGB of 6.7 hypotensive on 3 pressors (06 Jan 2022 11:08)      INFECTIOUS DISEASE HISTORY:  History as above.   ID consulted for sepsis, COVID management.   S/p PEA arrest  Patient intubated, on pressors    Discussed with .   She has been sick for past 6 months with weight lost, lethargy.   She has been seen by other providers for back pain and possible parasites in stool.   She reportedly had MRI of her spine done which showed no acute pathology; Was recommended for colonoscopy but patient refused.   She is vegetarian,  is concerned that she has not been eating well.   Denies any recent travel in past 2 years.   No reports of fevers, chills.   Her skin has been become more bronze/brown over the past few months.     PAST MEDICAL & SURGICAL HISTORY:  No pertinent past medical history    No significant past surgical history        FAMILY HISTORY  Unable to obtain     SOCIAL HISTORY  Social History:    no alcohol, illicit drugs or cigarette use (06 Jan 2022 11:08)        ROS  unable to obtain history secondary to patient's mental status and/or sedation      VITALS:  T(F): 94.5, Max: 99.5 (01-06-22 @ 17:42)  HR: 97  BP: 93/62  RR: 16Vital Signs Last 24 Hrs  T(C): 34.7 (07 Jan 2022 07:02), Max: 37.5 (06 Jan 2022 17:42)  T(F): 94.5 (07 Jan 2022 07:02), Max: 99.5 (06 Jan 2022 17:42)  HR: 97 (07 Jan 2022 06:00) (77 - 150)  BP: 93/62 (06 Jan 2022 18:20) (59/35 - 156/124)  BP(mean): 71 (06 Jan 2022 18:20) (71 - 133)  RR: 16 (07 Jan 2022 07:02) (16 - 138)  SpO2: 98% (07 Jan 2022 05:30) (87% - 100%)    PHYSICAL EXAM:  Gen: intubated  HEENT: Normocephalic, atraumatic  Neck: supple, no lymphadenopathy  CV: Regular rate & regular rhythm  Lungs: decreased BS at bases, no fremitus  Abdomen: Soft, BS present  Ext: Warm, well perfused  Neuro: non focal, sedated  Skin: no rash, no erythema  Lines: no phlebitis    TESTS & MEASUREMENTS:                        12.7   31.82 )-----------( 170      ( 07 Jan 2022 06:35 )             37.5     01-07    132<L>  |  97<L>  |  24<H>  ----------------------------<  284<H>  4.2   |  17  |  1.8<H>    Ca    6.0<L>      07 Jan 2022 06:35  Phos  5.2     01-07  Mg     1.9     01-07    TPro  4.3<L>  /  Alb  2.1<L>  /  TBili  0.6  /  DBili  x   /  AST  386<H>  /  ALT  155<H>  /  AlkPhos  46  01-07    eGFR if Non African American: 34 mL/min/1.73M2 (01-07-22 @ 06:35)  eGFR if : 39 mL/min/1.73M2 (01-07-22 @ 06:35)  eGFR if Non African American: 27 mL/min/1.73M2 (01-06-22 @ 21:49)  eGFR if : 31 mL/min/1.73M2 (01-06-22 @ 21:49)  eGFR if Non African American: 24 mL/min/1.73M2 (01-06-22 @ 18:00)  eGFR if : 28 mL/min/1.73M2 (01-06-22 @ 18:00)  eGFR if Non African American: 19 mL/min/1.73M2 (01-06-22 @ 08:30)  eGFR if : 22 mL/min/1.73M2 (01-06-22 @ 08:30)    LIVER FUNCTIONS - ( 07 Jan 2022 06:35 )  Alb: 2.1 g/dL / Pro: 4.3 g/dL / ALK PHOS: 46 U/L / ALT: 155 U/L / AST: 386 U/L / GGT: x                 Lactate, Blood: 5.1 mmol/L (01-07-22 @ 06:35)  Lactate, Blood: 3.8 mmol/L (01-06-22 @ 21:49)  Lactate, Blood: 8.3 mmol/L (01-06-22 @ 18:00)      INFECTIOUS DISEASES TESTING  HIV-1/2 Combo Result: Nonreact (01-06-22 @ 08:30)  COVID-19 PCR: Detected (01-06-22 @ 06:45)      RADIOLOGY & ADDITIONAL TESTS:  I have personally reviewed the last Chest xray  CXR  Xray Chest 1 View AP/PA:   ACC: [id number] EXAM:  XR CHEST 1 VIEW                          PROCEDURE DATE:  01/06/2022          INTERPRETATION:  STUDY INDICATION: et tube adjustment    TECHNIQUE:  Portable frontal view of the chest obtained.    COMPARISON: 1/6/2022.    FINDINGS/  IMPRESSION:    ET tube is 4 to 5 cm above the matilda. NG tube is below left   hemidiaphragm out of the field-of-view. Unchanged left IJ venous catheter.    Increased bilateral perihilar opacities. No pneumothorax.    Stable cardiomediastinal silhouette. Unchanged osseous structures.    --- End of Report ---            RAINER PALMA MD; Attending Radiologist  This document has been electronically signed. Jan 6 2022 12:37PM (01-06-22 @ 10:17)      CT      CARDIOLOGY TESTING  12 Lead ECG:   Ventricular Rate 145 BPM    Atrial Rate 145 BPM    P-R Interval 130 ms    QRS Duration 80 ms    Q-T Interval 264 ms    QTC Calculation(Bazett) 410 ms    P Axis 75 degrees    R Axis 83 degrees    T Axis 60 degrees    Diagnosis Line Sinus tachycardiawith Premature supraventricular complexes  Low voltage QRS  Nonspecific T wave abnormality  Abnormal ECG    Confirmed by YOVANI CLINE MD ([id number]) on 1/6/2022 6:03:19 PM (01-06-22 @ 14:45)  12 Lead ECG:   Ventricular Rate 95 BPM    Atrial Rate 95 BPM    P-R Interval 134 ms    QRS Duration 74 ms    Q-T Interval 420 ms    QTC Calculation(Bazett) 527 ms    P Axis 77 degrees    R Axis 75 degrees    T Axis 76 degrees    Diagnosis Line Normal sinus rhythm  Nonspecific ST abnormality      Confirmed by YOVANI CLINE MD ([id number]) on 1/6/2022 11:22:42 AM (01-06-22 @ 06:38)      MEDICATIONS  chlorhexidine 4% Liquid 1 Topical <User Schedule>  cyanocobalamin Injectable 1000 IntraMuscular once  dextrose 5% 1000 IV Continuous <Continuous>  DOPamine Infusion 5 IV Continuous <Continuous>  folic acid Injectable 1 IV Push once  hydrocortisone sodium succinate Injectable 100 IV Push every 8 hours  levETIRAcetam  IVPB 500 IV Intermittent every 12 hours  midazolam Infusion 0.02 IV Continuous <Continuous>  midazolam Injectable 2 IV Push once  norepinephrine Infusion 0.05 IV Continuous <Continuous>  pantoprazole  Injectable 40 IV Push two times a day  phenylephrine    Infusion 0.1 IV Continuous <Continuous>  sodium bicarbonate  Injectable 50 IV Push once  thiamine IVPB 500 IV Intermittent once  vasopressin Infusion 0.04 IV Continuous <Continuous>      Weight  Weight (kg): 36.3 (01-06-22 @ 06:34)    ANTIBIOTICS:      ALLERGIES:  epinephrine (Headache)

## 2022-01-07 NOTE — PROGRESS NOTE ADULT - ATTENDING COMMENTS
poor overall prognosis  apprecaite endo rec  cont stress dose steroids, iv synthroid  follow up repeat labs  pulm/cc management poor overall prognosis  apprecaite endo rec  cont stress dose steroids, iv synthroid  follow up repeat labs, CTh when stable   pulm/cc management

## 2022-01-07 NOTE — CONSULT NOTE ADULT - SUBJECTIVE AND OBJECTIVE BOX
HPI:  42 YO unvaccinated F has been sick for last 6 months as per  at bedside, loosing weight, hardly ambulates, doesn't bathe herself was up all night feeling worse one episode of diarrhea and  vomiting this am became unresponsive and   called EMS who found patient in asystole, started ACLS. Patient was hypoglycemic, received D50, epi x 5, bicarb and calcium. Went from asystole to PEA to sinus tach to sinus. In ED initial FS 36 COVID positive, HGB of 6.7 hypotensive on 3 pressors (06 Jan 2022 11:08)      history taken from patient  :  41 year old female with no significant PMH , who presented with cardiac arrest found to have COVID.    reports patient did not have nay previous thyroid disease but because of her fatigue she was told by family/friends she should get checked. no reported menstrual abnormalities and her periods were regular . no previous surgeries and was not on any medications including steroids. he reports she was loosing weight over the past 6 months     PAST MEDICAL & SURGICAL HISTORY  No pertinent past medical history    No significant past surgical history        FAMILY HISTORY:  FAMILY HISTORY:      SOCIAL HISTORY:  []smoker  []Alcohol  []Drug    ALLERGIES:  epinephrine (Headache)      MEDICATIONS:  MEDICATIONS  (STANDING):  chlorhexidine 0.12% Liquid 15 milliLiter(s) Oral Mucosa two times a day  chlorhexidine 4% Liquid 1 Application(s) Topical <User Schedule>  cyanocobalamin Injectable 1000 MICROGram(s) IntraMuscular once  dextrose 5% 1000 milliLiter(s) (75 mL/Hr) IV Continuous <Continuous>  DOPamine Infusion 5 MICROgram(s)/kG/Min (6.81 mL/Hr) IV Continuous <Continuous>  folic acid Injectable 1 milliGRAM(s) IV Push once  folic acid Injectable 1 milliGRAM(s) IV Push daily  hydrocortisone sodium succinate Injectable 100 milliGRAM(s) IV Push every 8 hours  levETIRAcetam  IVPB 1000 milliGRAM(s) IV Intermittent every 12 hours  midazolam Infusion 0.02 mG/kG/Hr (0.73 mL/Hr) IV Continuous <Continuous>  midazolam Injectable 2 milliGRAM(s) IV Push once  norepinephrine Infusion 0.05 MICROgram(s)/kG/Min (1.7 mL/Hr) IV Continuous <Continuous>  pantoprazole  Injectable 40 milliGRAM(s) IV Push two times a day  phenylephrine    Infusion 0.1 MICROgram(s)/kG/Min (0.68 mL/Hr) IV Continuous <Continuous>  sodium bicarbonate  Injectable 50 milliEquivalent(s) IV Push once  thiamine IVPB 300 milliGRAM(s) IV Intermittent every 8 hours  thiamine IVPB 500 milliGRAM(s) IV Intermittent once  vasopressin Infusion 0.04 Unit(s)/Min (2.4 mL/Hr) IV Continuous <Continuous>    MEDICATIONS  (PRN):      HOME MEDICATIONS:  Home Medications:      VITALS:   T(F): 95.9 (01-07 @ 11:00), Max: 99.5 (01-06 @ 17:42)  HR: 110 (01-07 @ 11:00) (72 - 150)  BP: 100/74 (01-07 @ 11:00) (59/35 - 156/124)  BP(mean): 82 (01-07 @ 11:00) (65 - 133)  RR: 18 (01-07 @ 11:00) (0 - 138)  SpO2: 97% (01-07 @ 10:00) (86% - 100%)    I&O's Summary    06 Jan 2022 07:01  -  07 Jan 2022 07:00  --------------------------------------------------------  IN: 6711.6 mL / OUT: 7750 mL / NET: -1038.4 mL    07 Jan 2022 07:01  -  07 Jan 2022 11:58  --------------------------------------------------------  IN: 1552.6 mL / OUT: 1025 mL / NET: 527.6 mL        REVIEW OF SYSTEMS:  limited as in HPI             LABS:                        12.7   31.82 )-----------( 170      ( 07 Jan 2022 06:35 )             37.5     01-07    132<L>  |  97<L>  |  24<H>  ----------------------------<  284<H>  4.2   |  17  |  1.8<H>    Ca    6.0<L>      07 Jan 2022 06:35  Phos  5.2     01-07  Mg     1.9     01-07    TPro  4.3<L>  /  Alb  2.1<L>  /  TBili  0.6  /  DBili  0.4<H>  /  AST  386<H>  /  ALT  155<H>  /  AlkPhos  46  01-07    PT/INR - ( 07 Jan 2022 06:35 )   PT: 32.10 sec;   INR: 2.82 ratio         PTT - ( 07 Jan 2022 06:35 )  PTT:47.1 sec  Lactate, Blood: 5.1 mmol/L *HH* (01-07-22 @ 06:35)  Troponin T, Serum: 0.10 ng/mL *HH* (01-07-22 @ 06:35)  Creatine Kinase, Serum: 10146 U/L *H* (01-07-22 @ 06:35)  Lactate, Blood: 3.8 mmol/L *H* (01-06-22 @ 21:49)  Troponin T, Serum: 0.06 ng/mL *HH* (01-06-22 @ 21:49)  Lactate, Blood: 8.3 mmol/L *HH* (01-06-22 @ 18:00)  Troponin T, Serum: 0.10 ng/mL *HH* (01-06-22 @ 18:00)  Creatine Kinase, Serum: 90237 U/L *H* (01-06-22 @ 18:00)    CARDIAC MARKERS ( 07 Jan 2022 06:35 )  x     / 0.10 ng/mL / 02865 U/L / x     / x      CARDIAC MARKERS ( 06 Jan 2022 21:49 )  x     / 0.06 ng/mL / x     / x     / x      CARDIAC MARKERS ( 06 Jan 2022 18:00 )  x     / 0.10 ng/mL / 97826 U/L / x     / x      CARDIAC MARKERS ( 06 Jan 2022 08:30 )  x     / 0.05 ng/mL / x     / x     / x          01-07 Chol 48 LDL -- HDL 20<L> Trig 99  POCT Blood Glucose.: 156 mg/dL (01-07-22 @ 08:32)  POCT Blood Glucose.: 172 mg/dL (01-07-22 @ 07:05)  POCT Blood Glucose.: 103 mg/dL (01-07-22 @ 02:56)  POCT Blood Glucose.: 95 mg/dL (01-06-22 @ 22:52)  POCT Blood Glucose.: 166 mg/dL (01-06-22 @ 17:45)  POCT Blood Glucose.: 99 mg/dL (01-06-22 @ 08:16)  POCT Blood Glucose.: 104 mg/dL (01-06-22 @ 07:37)  POCT Blood Glucose.: 36 mg/dL (01-06-22 @ 06:33)    TSH 19.59; Total T3 101; Free T4 --   HPI:  42 YO unvaccinated F has been sick for last 6 months as per  at bedside, loosing weight, hardly ambulates, doesn't bathe herself was up all night feeling worse one episode of diarrhea and  vomiting this am became unresponsive and   called EMS who found patient in asystole, started ACLS. Patient was hypoglycemic, received D50, epi x 5, bicarb and calcium. Went from asystole to PEA to sinus tach to sinus. In ED initial FS 36 COVID positive, HGB of 6.7 hypotensive on 3 pressors (06 Jan 2022 11:08)      history taken from patient  :  41 year old female with no significant PMH , who presented with cardiac arrest found to have COVID.    reports patient did not have nay previous thyroid disease but because of her fatigue she was told by family/friends she should get checked. no reported menstrual abnormalities and her periods were regular . no previous surgeries and was not on any medications including steroids. he reports she was loosing weight over the past 6 months     PAST MEDICAL & SURGICAL HISTORY  No pertinent past medical history    No significant past surgical history        FAMILY HISTORY:  FAMILY HISTORY:      SOCIAL HISTORY:  []smoker  []Alcohol  []Drug    ALLERGIES:  epinephrine (Headache)      MEDICATIONS:  MEDICATIONS  (STANDING):  chlorhexidine 0.12% Liquid 15 milliLiter(s) Oral Mucosa two times a day  chlorhexidine 4% Liquid 1 Application(s) Topical <User Schedule>  cyanocobalamin Injectable 1000 MICROGram(s) IntraMuscular once  dextrose 5% 1000 milliLiter(s) (75 mL/Hr) IV Continuous <Continuous>  DOPamine Infusion 5 MICROgram(s)/kG/Min (6.81 mL/Hr) IV Continuous <Continuous>  folic acid Injectable 1 milliGRAM(s) IV Push once  folic acid Injectable 1 milliGRAM(s) IV Push daily  hydrocortisone sodium succinate Injectable 100 milliGRAM(s) IV Push every 8 hours  levETIRAcetam  IVPB 1000 milliGRAM(s) IV Intermittent every 12 hours  midazolam Infusion 0.02 mG/kG/Hr (0.73 mL/Hr) IV Continuous <Continuous>  midazolam Injectable 2 milliGRAM(s) IV Push once  norepinephrine Infusion 0.05 MICROgram(s)/kG/Min (1.7 mL/Hr) IV Continuous <Continuous>  pantoprazole  Injectable 40 milliGRAM(s) IV Push two times a day  phenylephrine    Infusion 0.1 MICROgram(s)/kG/Min (0.68 mL/Hr) IV Continuous <Continuous>  sodium bicarbonate  Injectable 50 milliEquivalent(s) IV Push once  thiamine IVPB 300 milliGRAM(s) IV Intermittent every 8 hours  thiamine IVPB 500 milliGRAM(s) IV Intermittent once  vasopressin Infusion 0.04 Unit(s)/Min (2.4 mL/Hr) IV Continuous <Continuous>    MEDICATIONS  (PRN):      HOME MEDICATIONS:  Home Medications:      VITALS:   T(F): 95.9 (01-07 @ 11:00), Max: 99.5 (01-06 @ 17:42)  HR: 110 (01-07 @ 11:00) (72 - 150)  BP: 100/74 (01-07 @ 11:00) (59/35 - 156/124)  BP(mean): 82 (01-07 @ 11:00) (65 - 133)  RR: 18 (01-07 @ 11:00) (0 - 138)  SpO2: 97% (01-07 @ 10:00) (86% - 100%)    I&O's Summary    06 Jan 2022 07:01  -  07 Jan 2022 07:00  --------------------------------------------------------  IN: 6711.6 mL / OUT: 7750 mL / NET: -1038.4 mL    07 Jan 2022 07:01  -  07 Jan 2022 11:58  --------------------------------------------------------  IN: 1552.6 mL / OUT: 1025 mL / NET: 527.6 mL        REVIEW OF SYSTEMS:  limited as in HPI     PE: patient intubated, sedated, nonresponsive   neck : enlarged thyroid , unable to feel nodules   heart : Rs1s2 , no murmur   lungs: intubated bilateral air entry   LE: no edema   SKIN : areas pf hyperpigmentation around neck , palms and LE         LABS:                        12.7   31.82 )-----------( 170      ( 07 Jan 2022 06:35 )             37.5     01-07    132<L>  |  97<L>  |  24<H>  ----------------------------<  284<H>  4.2   |  17  |  1.8<H>    Ca    6.0<L>      07 Jan 2022 06:35  Phos  5.2     01-07  Mg     1.9     01-07    TPro  4.3<L>  /  Alb  2.1<L>  /  TBili  0.6  /  DBili  0.4<H>  /  AST  386<H>  /  ALT  155<H>  /  AlkPhos  46  01-07    PT/INR - ( 07 Jan 2022 06:35 )   PT: 32.10 sec;   INR: 2.82 ratio         PTT - ( 07 Jan 2022 06:35 )  PTT:47.1 sec  Lactate, Blood: 5.1 mmol/L ** (01-07-22 @ 06:35)  Troponin T, Serum: 0.10 ng/mL ** (01-07-22 @ 06:35)  Creatine Kinase, Serum: 15291 U/L *H* (01-07-22 @ 06:35)  Lactate, Blood: 3.8 mmol/L *H* (01-06-22 @ 21:49)  Troponin T, Serum: 0.06 ng/mL *HH* (01-06-22 @ 21:49)  Lactate, Blood: 8.3 mmol/L ** (01-06-22 @ 18:00)  Troponin T, Serum: 0.10 ng/mL ** (01-06-22 @ 18:00)  Creatine Kinase, Serum: 43708 U/L *H* (01-06-22 @ 18:00)    CARDIAC MARKERS ( 07 Jan 2022 06:35 )  x     / 0.10 ng/mL / 41376 U/L / x     / x      CARDIAC MARKERS ( 06 Jan 2022 21:49 )  x     / 0.06 ng/mL / x     / x     / x      CARDIAC MARKERS ( 06 Jan 2022 18:00 )  x     / 0.10 ng/mL / 79223 U/L / x     / x      CARDIAC MARKERS ( 06 Jan 2022 08:30 )  x     / 0.05 ng/mL / x     / x     / x          01-07 Chol 48 LDL -- HDL 20<L> Trig 99  POCT Blood Glucose.: 156 mg/dL (01-07-22 @ 08:32)  POCT Blood Glucose.: 172 mg/dL (01-07-22 @ 07:05)  POCT Blood Glucose.: 103 mg/dL (01-07-22 @ 02:56)  POCT Blood Glucose.: 95 mg/dL (01-06-22 @ 22:52)  POCT Blood Glucose.: 166 mg/dL (01-06-22 @ 17:45)  POCT Blood Glucose.: 99 mg/dL (01-06-22 @ 08:16)  POCT Blood Glucose.: 104 mg/dL (01-06-22 @ 07:37)  POCT Blood Glucose.: 36 mg/dL (01-06-22 @ 06:33)    TSH 19.59; Total T3 101; Free T4 --

## 2022-01-07 NOTE — CONSULT NOTE ADULT - SUBJECTIVE AND OBJECTIVE BOX
NEPHROLOGY CONSULTATION NOTE    CASS FREEMAN  41y  Female  MRN-[id number]    CC:   Patient is a 41y old  Female who presents with a chief complaint of cardiac arrest (2022 11:58)      HPI:  42 YO unvaccinated F has been sick for last 6 months as per  at bedside, losing weight, hardly ambulates, doesn't bathe herself was up all night feeling worse one episode of diarrhea and  vomiting this am became unresponsive and  called EMS who found patient in asystole, started ACLS. Patient was hypoglycemic, received D50, epi x 5, bicarb and calcium. Went from asystole to PEA to sinus tach to sinus. In ED initial FS 36 COVID positive, HGB of 6.7 hypotensive on 3 pressors (2022 11:08)      PAST MEDICAL & SURGICAL HISTORY:  No pertinent past medical history    No significant past surgical history      Allergies:  epinephrine (Headache)    Home Medications Reviewed  Hospital Medications:   MEDICATIONS  (STANDING):  chlorhexidine 0.12% Liquid 15 milliLiter(s) Oral Mucosa two times a day  chlorhexidine 4% Liquid 1 Application(s) Topical <User Schedule>  cyanocobalamin Injectable 1000 MICROGram(s) IntraMuscular once  dextrose 5% 1000 milliLiter(s) (75 mL/Hr) IV Continuous <Continuous>  DOPamine Infusion 5 MICROgram(s)/kG/Min (6.81 mL/Hr) IV Continuous <Continuous>  folic acid Injectable 1 milliGRAM(s) IV Push daily  folic acid Injectable 1 milliGRAM(s) IV Push once  hydrocortisone sodium succinate Injectable 100 milliGRAM(s) IV Push every 8 hours  levETIRAcetam  IVPB 1000 milliGRAM(s) IV Intermittent every 12 hours  midazolam Infusion 0.02 mG/kG/Hr (0.73 mL/Hr) IV Continuous <Continuous>  midazolam Injectable 2 milliGRAM(s) IV Push once  norepinephrine Infusion 0.05 MICROgram(s)/kG/Min (1.7 mL/Hr) IV Continuous <Continuous>  pantoprazole  Injectable 40 milliGRAM(s) IV Push two times a day  phenylephrine    Infusion 0.1 MICROgram(s)/kG/Min (0.68 mL/Hr) IV Continuous <Continuous>  sodium bicarbonate  Injectable 50 milliEquivalent(s) IV Push once  thiamine IVPB 300 milliGRAM(s) IV Intermittent every 8 hours  thiamine IVPB 500 milliGRAM(s) IV Intermittent once  vasopressin Infusion 0.04 Unit(s)/Min (2.4 mL/Hr) IV Continuous <Continuous>      Home medications:      SOCIAL HISTORY:  Social History:    no alcohol, illicit drugs or cigarette use (2022 11:08)      FAMILY HISTORY:      REVIEW OF SYSTEMS:   unable to obtain d/t critical illness    VITALS:  T(F): 98.4 (22 @ 12:00), Max: 99.5 (22 @ 17:42)  HR: 126 (22 @ 12:00)  BP: 95/66 (22 @ 12:00)  RR: 19 (22 @ 13:00)  SpO2: 97% (22 @ 10:00)  Mode: Auto Mode: PRVC/ Volume Support  RR (machine): 16  TV (machine): 400  FiO2: 100  PEEP: 5  MAP: 10  PIP: 22      Weight (kg): 51 ( @ 18:00)  I&O's Detail    2022 07:  -  2022 07:00  --------------------------------------------------------  IN:    dextrose 5% w/ Additives: 1050 mL    DOPamine Infusion: 756 mL    IV PiggyBack: 500 mL    Midazolam: 88 mL    Norepinephrine: 2380 mL    Phenylephrine: 1496 mL    Phenylephrine: 408 mL    Vasopressin: 33.6 mL  Total IN: 6711.6 mL    OUT:    Gastrostomy Tube (mL): 2850 mL    Indwelling Catheter - Urethral (mL): 3925 mL    Voided (mL): 975 mL  Total OUT: 7750 mL    Total NET: -1038.4 mL      2022 07:  -  2022 13:12  --------------------------------------------------------  IN:    dextrose 5% w/ Additives: 75 mL    DOPamine Infusion: 216 mL    IV PiggyBack: 100 mL    Midazolam: 28 mL    Norepinephrine: 680 mL    Phenylephrine: 544 mL    Vasopressin: 9.6 mL  Total IN: 1652.6 mL    OUT:    Indwelling Catheter - Urethral (mL): 1200 mL  Total OUT: 1200 mL    Total NET: 452.6 mL        Mode: Auto Mode: PRVC/ Volume Support, RR (machine): 16, TV (machine): 400, FiO2: 100, PEEP: 5, MAP: 10, PIP: 22    I&O's Summary    2022 07:  -  2022 07:00  --------------------------------------------------------  IN: 6711.6 mL / OUT: 7750 mL / NET: -1038.4 mL    2022 07:  -  2022 13:12  --------------------------------------------------------  IN: 1652.6 mL / OUT: 1200 mL / NET: 452.6 mL        PHYSICAL EXAM:  Gen: intubated/ventilated  resp: b/l breath sounds   card: S1/S2  abd: soft  ext: no edema  vascular access:     LABS:  Daily     Daily Weight in k.2 (2022 05:00)  ABG - ( 2022 03:53 )  pH, Arterial: 7.30  pH, Blood: x     /  pCO2: 38    /  pO2: 104   / HCO3: 19    / Base Excess: -7.1  /  SaO2: 98.1        Lactate, Blood: 5.1 mmol/L (22 @ 06:35)  Blood Gas Arterial, Lactate: 3.80 mmol/L (22 @ 03:53)  Blood Gas Arterial - Potassium: 3.7 mmol/L (22 @ 03:53)        132<L>  |  97<L>  |  24<H>  ----------------------------<  284<H>  4.2   |  17  |  1.8<H>    Ca    6.0<L>      2022 06:35  Phos  5.2       Mg     1.9         TPro  4.3<L>  /  Alb  2.1<L>  /  TBili  0.6  /  DBili  0.4<H>  /  AST  386<H>  /  ALT  155<H>  /  AlkPhos  46        Creatinine Trend:   Creatinine, Serum: 1.8 mg/dL (22 @ 06:35)  Creatinine, Serum: 2.2 mg/dL (22 @ 21:49)  Creatinine, Serum: 2.4 mg/dL (22 @ 18:00)  Creatinine, Serum: 2.9 mg/dL (22 @ 08:30)  Creatinine, Serum: 0.9 mg/dL (21 @ 15:41)  Creatinine, Serum: 0.8 mg/dL (19 @ 19:00)  Creatinine, Serum: 0.7 mg/dL (19 @ 17:37)    Creatine Kinase, Serum: 84203 U/L (22 @ 06:35)  Creatine Kinase, Serum: 44586 U/L (22 @ 18:00)                          12.7   31.82 )-----------( 170      ( 2022 06:35 )             37.5     Mean Cell Volume: 84.1 fL (22 @ 06:35)    Urine Studies:              RADIOLOGY & ADDITIONAL STUDIES:      < from: Xray Chest 1 View AP/PA (22 @ 10:17) >    ACC: [id number] EXAM:  XR CHEST 1 VIEW                          PROCEDURE DATE:  2022          INTERPRETATION:  STUDY INDICATION: et tube adjustment    TECHNIQUE:  Portable frontal view of the chest obtained.    COMPARISON: 2022.    FINDINGS/  IMPRESSION:    ET tube is 4 to 5 cm above the matilda. NG tube is below left   hemidiaphragm out of the field-of-view. Unchanged left IJ venous catheter.    Increased bilateral perihilar opacities. No pneumothorax.    Stable cardiomediastinal silhouette. Unchanged osseous structures.    --- End of Report ---            RAINER PALMA MD; Attending Radiologist  This document has been electronically signed. 2022 12:37PM    < end of copied text >                         NEPHROLOGY CONSULTATION NOTE    CASS FREEMAN  41y  Female  MRN-[id number]    CC:   Patient is a 41y old  Female who presents with a chief complaint of cardiac arrest (2022 11:58)      HPI:  40 YO unvaccinated F has been sick for last 6 months as per  at bedside, losing weight, hardly ambulates, doesn't bathe herself was up all night feeling worse one episode of diarrhea and  vomiting this am became unresponsive and  called EMS who found patient in asystole, started ACLS. Patient was hypoglycemic, received D50, epi x 5, bicarb and calcium. Went from asystole to PEA to sinus tach to sinus. In ED initial FS 36 COVID positive, HGB of 6.7 hypotensive on 3 pressors (2022 11:08)      PAST MEDICAL & SURGICAL HISTORY:  No pertinent past medical history    No significant past surgical history      Allergies:  epinephrine (Headache)    Home Medications Reviewed  Hospital Medications:   MEDICATIONS  (STANDING):  cyanocobalamin Injectable 1000 MICROGram(s) IntraMuscular once  dextrose 5% 1000 milliLiter(s) (75 mL/Hr) IV Continuous <Continuous>  DOPamine Infusion 5 MICROgram(s)/kG/Min (6.81 mL/Hr) IV Continuous <Continuous>  folic acid Injectable 1 milliGRAM(s) IV Push daily  hydrocortisone sodium succinate Injectable 100 milliGRAM(s) IV Push every 8 hours  levETIRAcetam  IVPB 1000 milliGRAM(s) IV Intermittent every 12 hours  midazolam Infusion 0.02 mG/kG/Hr (0.73 mL/Hr) IV Continuous <Continuous>  norepinephrine Infusion 0.05 MICROgram(s)/kG/Min (1.7 mL/Hr) IV Continuous <Continuous>  pantoprazole  Injectable 40 milliGRAM(s) IV Push two times a day  phenylephrine    Infusion 0.1 MICROgram(s)/kG/Min (0.68 mL/Hr) IV Continuous <Continuous>  thiamine IVPB 300 milliGRAM(s) IV Intermittent every 8 hours  vasopressin Infusion 0.04 Unit(s)/Min (2.4 mL/Hr) IV Continuous <Continuous>      Home medications:      SOCIAL HISTORY:  Social History:    no alcohol, illicit drugs or cigarette use (2022 11:08)      FAMILY HISTORY:      REVIEW OF SYSTEMS:   unable to obtain d/t critical illness    VITALS:  T(F): 98.4 (22 @ 12:00), Max: 99.5 (22 @ 17:42)  HR: 126 (22 @ 12:00)  BP: 95/66 (22 @ 12:00)  RR: 19 (22 @ 13:00)  SpO2: 97% (22 @ 10:00)  Mode: Auto Mode: PRVC/ Volume Support  RR (machine): 16  TV (machine): 400  FiO2: 100  PEEP: 5  MAP: 10  PIP: 22      Weight (kg): 51 ( @ 18:00)  I&O's Detail    2022 07:  -  2022 07:00  --------------------------------------------------------  IN:    dextrose 5% w/ Additives: 1050 mL    DOPamine Infusion: 756 mL    IV PiggyBack: 500 mL    Midazolam: 88 mL    Norepinephrine: 2380 mL    Phenylephrine: 1496 mL    Phenylephrine: 408 mL    Vasopressin: 33.6 mL  Total IN: 6711.6 mL    OUT:    Gastrostomy Tube (mL): 2850 mL    Indwelling Catheter - Urethral (mL): 3925 mL    Voided (mL): 975 mL  Total OUT: 7750 mL    Total NET: -1038.4 mL      2022 07:01  -  2022 13:12  --------------------------------------------------------  IN:    dextrose 5% w/ Additives: 75 mL    DOPamine Infusion: 216 mL    IV PiggyBack: 100 mL    Midazolam: 28 mL    Norepinephrine: 680 mL    Phenylephrine: 544 mL    Vasopressin: 9.6 mL  Total IN: 1652.6 mL    OUT:    Indwelling Catheter - Urethral (mL): 1200 mL  Total OUT: 1200 mL    Total NET: 452.6 mL        Mode: Auto Mode: PRVC/ Volume Support, RR (machine): 16, TV (machine): 400, FiO2: 100, PEEP: 5, MAP: 10, PIP: 22    I&O's Summary    2022 07:  -  2022 07:00  --------------------------------------------------------  IN: 6711.6 mL / OUT: 7750 mL / NET: -1038.4 mL    2022 07:  -  2022 13:12  --------------------------------------------------------  IN: 1652.6 mL / OUT: 1200 mL / NET: 452.6 mL        PHYSICAL EXAM:  Gen: intubated/ventilated  resp: b/l breath sounds   card: tachycardic  abd: soft  ext: no edema  Liam      LABS:  Daily     Daily Weight in k.2 (2022 05:00)  ABG - ( 2022 03:53 )  pH, Arterial: 7.30  pH, Blood: x     /  pCO2: 38    /  pO2: 104   / HCO3: 19    / Base Excess: -7.1  /  SaO2: 98.1        Lactate, Blood: 5.1 mmol/L (22 @ 06:35)  Blood Gas Arterial, Lactate: 3.80 mmol/L (22 @ 03:53)  Blood Gas Arterial - Potassium: 3.7 mmol/L (22 @ 03:53)        132<L>  |  97<L>  |  24<H>  ----------------------------<  284<H>  4.2   |  17  |  1.8<H>    Ca    6.0<L>      2022 06:35  Phos  5.2       Mg     1.9         TPro  4.3<L>  /  Alb  2.1<L>  /  TBili  0.6  /  DBili  0.4<H>  /  AST  386<H>  /  ALT  155<H>  /  AlkPhos  46        Creatinine Trend:   Creatinine, Serum: 1.8 mg/dL (22 @ 06:35)  Creatinine, Serum: 2.2 mg/dL (22 @ 21:49)  Creatinine, Serum: 2.4 mg/dL (22 @ 18:00)  Creatinine, Serum: 2.9 mg/dL (22 @ 08:30)  Creatinine, Serum: 0.9 mg/dL (21 @ 15:41)  Creatinine, Serum: 0.8 mg/dL (19 @ 19:00)  Creatinine, Serum: 0.7 mg/dL (19 @ 17:37)    Creatine Kinase, Serum: 70994 U/L (22 @ 06:35)  Creatine Kinase, Serum: 24373 U/L (22 @ 18:00)                          12.7   31.82 )-----------( 170      ( 2022 06:35 )             37.5     Mean Cell Volume: 84.1 fL (22 @ 06:35)    Urine Studies:              RADIOLOGY & ADDITIONAL STUDIES:      < from: Xray Chest 1 View AP/PA (22 @ 10:17) >    ACC: [id number] EXAM:  XR CHEST 1 VIEW                          PROCEDURE DATE:  2022          INTERPRETATION:  STUDY INDICATION: et tube adjustment    TECHNIQUE:  Portable frontal view of the chest obtained.    COMPARISON: 2022.    FINDINGS/  IMPRESSION:    ET tube is 4 to 5 cm above the matilda. NG tube is below left   hemidiaphragm out of the field-of-view. Unchanged left IJ venous catheter.    Increased bilateral perihilar opacities. No pneumothorax.    Stable cardiomediastinal silhouette. Unchanged osseous structures.    --- End of Report ---            RAINER PALMA MD; Attending Radiologist  This document has been electronically signed. 2022 12:37PM    < end of copied text >

## 2022-01-07 NOTE — PROGRESS NOTE ADULT - ASSESSMENT
42 YO unvaccinated F has been sick for last 6 months as per  at bedside, loosing weight, hardly ambulates, doesn't bathe herself was up all night feeling worse one episode of diarrhea and  vomiting this am became unresponsive and   called EMS who found patient in asystole, started ACLS.    IMPRESSION:  Cardiopulmonary arrest (non-shockable)  Hypoglycemia   COVID +ve  Septic shock  Sepsis present on admission  Major depression  Severe KELSEA  Hypokalemia  Severe Malnutrition  Severe Anemia(no evidence of active bleed)  Lactic acidosis  Rhabdomyolysis    PLAN:    NEURO:  - Intubated sedated on versed.  - Will do CT head once stable  - Continue Keppra 100 bid,  - check VEEG, try to go wean down on versed   - UDS/SDS  - Thiamine 300q8, folate, MV, Psych eval, Strat vitamin B12.  - Check vitamin b12 levels, check homocysteine+  - Psych eval     CARDIOVASCULAR:   - On 3 pressors   - Check 2d echo  - LE duplex, probnp   - Continue NAhco3,   - Repeat CK levels    RENAL:   - Daily cmp  - Continue NAHCO3    INFECTIOUS DISEASE: F/u culture, procal, continue abx.    HEMATOLOGICAL:  DVT prophylaxis(SCD for now), vitamin k 1 mg IV    ENDOCRINE: Endo consult. Follow up FS. HC 100q8. F/u a1c, start levothyroxine    Overall Poor prognosis    Diet: NPO except meds  Activity: Bedrest  DVT Prophylaxis: only SCDs  GI Prophylaxis: PPI  CHG Ordered  Code Status: FULL  Disposition: CCU    Updated  at bedside and Sister at          42 YO unvaccinated F has been sick for last 6 months as per  at bedside, loosing weight, hardly ambulates, doesn't bathe herself was up all night feeling worse one episode of diarrhea and  vomiting this am became unresponsive and   called EMS who found patient in asystole, s/p cardiac arrest. Now intubated sedated on 3 pressors.    IMPRESSION:  Cardiopulmonary arrest (non-shockable)  Hypoglycemia   COVID +ve  Septic shock  Sepsis present on admission  Major depression  Severe KELSEA  Hypokalemia  Severe Malnutrition  Severe Anemia(no evidence of active bleed)  Lactic acidosis  Rhabdomyolysis    PLAN:    NEURO:  - Intubated sedated on versed.  - Will do CT head once stable  - Continue Keppra 1000 mg bid,  - check VEEG, try to wean down on versed for VEEG   - UDS/SDS  - Thiamine 300q8, folate, MV, Strat vitamin B12.  - Check vitamin b12 levels, check homocysteine+  - Psych eval     CARDIOVASCULAR:   - On 3 pressors   - Check 2d echo  - LE duplex, probnp   - Continue NAhco3,   - Repeat CK levels    RENAL:   - Daily cmp  - Continue NAHCO3    INFECTIOUS DISEASE: F/u culture, procal, continue abx.    HEMATOLOGICAL:  DVT prophylaxis(SCD for now), vitamin k 1 mg IV    ENDOCRINE: Endo consult. Follow up FS. HC 100q8. F/u a1c, start levothyroxine    GASTRO: Severe Malnutrition: follow serum vitamin b12, b1, zinc, folate. Followup nutrition support recs    Overall Poor prognosis    Diet: NPO except meds  Activity: Bedrest  DVT Prophylaxis: only SCDs  GI Prophylaxis: PPI  CHG Ordered  Code Status: FULL  Disposition: CCU    Updated  at bedside and Sister at

## 2022-01-07 NOTE — CONSULT NOTE ADULT - REASON FOR ADMISSION
cardiac arrest
cardiac arrest/hypoglycemia

## 2022-01-07 NOTE — CONSULT NOTE ADULT - CONSULT REQUESTED DATE/TIME
06-Jan-2022 13:45
06-Jan-2022 11:39
07-Jan-2022 06:05
07-Jan-2022
07-Jan-2022 11:58
07-Jan-2022
07-Jan-2022 11:45

## 2022-01-07 NOTE — CHART NOTE - NSCHARTNOTEFT_GEN_A_CORE
Noted RD and NST consult, jeferson w/Aliyah alvarado/Dr. Gannon's service, NST will be seeing this pt. RD consult d/c'd.

## 2022-01-07 NOTE — CONSULT NOTE ADULT - ASSESSMENT
41 year old female with no significant PMH , who presented with cardiac arrest found to have COVID. now intubated on pressors in ICU     #hypothyroidism /?AI /hypoglycemia   - 1/6/22: TSH 19.5 TT4 2.6 low and TT3 normal not on any home meds   - patient already on Hydrocortisone stress dose for possible AI , total cortisol was drawn post HC not reliable, for now agree with stress dose HC   - agree with starting levothyroxine 50 mcg IV now   - repeat Tft's in 2-3 days (TSH , FreeT4 and TT3)  41 year old female with no significant PMH , who presented with cardiac arrest found to have COVID. now intubated on pressors in ICU     #hypothyroidism /?AI /hypoglycemia /hyperpigmented skin   - 1/6/22: TSH 19.5 TT4 2.6 low and TT3 normal not on any home meds   - patient already on Hydrocortisone stress dose for possible AI , total cortisol was drawn post HC not reliable, for now agree to continue stress dose HC   - agree with starting levothyroxine 50 mcg IV now   - please check 21 hydroxylase antibodies and tpo antibodies   - repeat Tft's in 2 days (TSH , FreeT4 and TT3)

## 2022-01-07 NOTE — CONSULT NOTE ADULT - ASSESSMENT
ASSESSMENT  42 YO unvaccinated F has been sick for last 6 months as per  at bedside, loosing weight, hardly ambulates who presents for unresponsiveness, found by EMS to be in PEA arrest     IMPRESSION  #PEA arrest   #Shock, septic/cardiogenic  #Severe Anemia  #Hypoglycemia  #Transaminitis  #COVID-19 - unvaccinated  #Abx allergy: epinephrine (Headache)    RECOMMENDATIONS  - low suspicion for infectious etiology but can continue vancomycin by levels - check vancomycin level with AM labs (if < 15, can give 750 mg x 1)   - continue cefepime 1g q 12 hours   - if blood cx negative, stop antibiotics   - consider checking iron panel and RUQ US - for work up of hemochromatosis (bronze skin, possible endocrinopathy)  - check B1, B12, Folate, Zinc for nutritional deficiencies   - follow-up head CT    Please call or message on [employer] Teams if with any questions.  Spectra [phone]   ASSESSMENT  40 YO unvaccinated F has been sick for last 6 months as per  at bedside, loosing weight, hardly ambulates who presents for unresponsiveness, found by EMS to be in PEA arrest     IMPRESSION  #PEA arrest   #Shock, septic/cardiogenic  #Severe Anemia  #Hypoglycemia  #Transaminitis  #COVID-19 - unvaccinated  #Abx allergy: epinephrine (Headache)    RECOMMENDATIONS  - low suspicion for infectious etiology but can continue vancomycin by levels - check vancomycin level with AM labs (if < 15, can give 750 mg x 1)   - continue cefepime 1g q 12 hours   - if blood cx negative, stop antibiotics   - consider checking iron panel and RUQ US - for work up of hemochromatosis (bronze skin, possible endocrinopathy)  - check B1, B12, Folate, Zinc for nutritional deficiencies   - on hydrocortisone   - follow-up head CT  - poor prognosis     Please call or message on [employer] Teams if with any questions.  Spectra [phone]

## 2022-01-07 NOTE — CONSULT NOTE ADULT - SUBJECTIVE AND OBJECTIVE BOX
HPI:  40 YO unvaccinated F has been sick for last 6 months as per  at bedside, loosing weight, hardly ambulates, doesn't bathe herself was up all night feeling worse one episode of diarrhea and  vomiting this am became unresponsive and   called EMS who found patient in asystole, started ACLS. Patient was hypoglycemic, received D50, epi x 5, bicarb and calcium. Went from asystole to PEA to sinus tach to sinus. In ED initial FS 36 COVID positive, HGB of 6.7 hypotensive on 3 pressors (06 Jan 2022 11:08)      PAST MEDICAL & SURGICAL HISTORY:  No pertinent past medical history    No significant past surgical history  ICU Vital Signs Last 24 Hrs  T(C): 37.5 (07 Jan 2022 15:01), Max: 37.7 (07 Jan 2022 13:00)  T(F): 99.5 (07 Jan 2022 15:01), Max: 99.9 (07 Jan 2022 13:00)  HR: 135 (07 Jan 2022 16:18) (91 - 147)  BP: 90/64 (07 Jan 2022 16:00) (62/42 - 156/124)  BP(mean): 73 (07 Jan 2022 16:00) (47 - 133)  ABP: 101/83 (07 Jan 2022 16:00) (68/45 - 108/88)  ABP(mean): 90 (07 Jan 2022 16:00) (57 - 97)  RR: 17 (07 Jan 2022 16:00) (0 - 138)  SpO2: 99% (07 Jan 2022 16:18) (86% - 100%)  Weight (kg): 51 (01-06-22 @ 18:00)  Ideal Body Weight: kG   06 Jan 2022 07:01  -  07 Jan 2022 07:00  --------------------------------------------------------  IN:    dextrose 5% w/ Additives: 1050 mL    DOPamine Infusion: 756 mL    IV PiggyBack: 500 mL    Midazolam: 88 mL    Norepinephrine: 2380 mL    Phenylephrine: 1496 mL    Phenylephrine: 408 mL    Vasopressin: 33.6 mL  Total IN: 6711.6 mL    OUT:    Gastrostomy Tube (mL): 2850 mL    Indwelling Catheter - Urethral (mL): 3925 mL    Voided (mL): 975 mL  Total OUT: 7750 mL  Total NET: -1038.4 mL  7 Jan 2022 07:01  -  07 Jan 2022 16:30  --------------------------------------------------------  IN:    dextrose 5% w/ Additives: 525 mL    DOPamine Infusion: 440 mL    IV PiggyBack: 200 mL    Midazolam: 54 mL    Norepinephrine: 1339 mL    Phenylephrine: 848 mL    Vasopressin: 19.2 mL  Total IN: 3425.2 mL    OUT:    Indwelling Catheter - Urethral (mL): 1425 mL  Total OUT: 1425 mL  Total NET: 2000.2 mL  Mode: Auto Mode: PRVC/ Volume Support  RR (machine): 16  TV (machine): 400  FiO2: 60  PEEP: 5  MAP: 11  PIP: 23  PHYSICAL EXAM:  GENERAL: vented  HEENT: Moist mucous membranes,   ABDOMEN: Soft,  EXTREMITIES:  no edema  SKIN: No rashes or lesions  IV ACCESS:  FEEDING ACCESS:    MEDICATIONS  (STANDING):  calcium carbonate    500 mG (Tums) Chewable 1 Tablet(s) Chew every 6 hours  chlorhexidine 0.12% Liquid 15 milliLiter(s) Oral Mucosa two times a day  chlorhexidine 4% Liquid 1 Application(s) Topical <User Schedule>  cyanocobalamin Injectable 1000 MICROGram(s) IntraMuscular once  DOPamine Infusion 5 MICROgram(s)/kG/Min (6.81 mL/Hr) IV Continuous <Continuous>  folic acid Injectable 1 milliGRAM(s) IV Push once  folic acid Injectable 1 milliGRAM(s) IV Push daily  hydrocortisone sodium succinate Injectable 100 milliGRAM(s) IV Push every 8 hours  levETIRAcetam  IVPB 1000 milliGRAM(s) IV Intermittent every 12 hours  midazolam Infusion 0.02 mG/kG/Hr (0.73 mL/Hr) IV Continuous <Continuous>  midazolam Injectable 2 milliGRAM(s) IV Push once  norepinephrine Infusion 0.05 MICROgram(s)/kG/Min (1.7 mL/Hr) IV Continuous <Continuous>  pantoprazole  Injectable 40 milliGRAM(s) IV Push two times a day  phenylephrine    Infusion 0.1 MICROgram(s)/kG/Min (0.68 mL/Hr) IV Continuous <Continuous>  sodium bicarbonate  Injectable 50 milliEquivalent(s) IV Push once  sodium chloride 0.45% 1000 milliLiter(s) (75 mL/Hr) IV Continuous <Continuous>  thiamine IVPB 300 milliGRAM(s) IV Intermittent every 8 hours  thiamine IVPB 500 milliGRAM(s) IV Intermittent once  vasopressin Infusion 0.04 Unit(s)/Min (2.4 mL/Hr) IV Continuous <Continuous>    MEDICATIONS  (PRN):    Allergies    epinephrine (Headache)    Intolerances        LABS:    01-07    132<L>  |  97<L>  |  24<H>  ----------------------------<  284<H>  4.2   |  17  |  1.8<H>    Ca    6.0<L>      07 Jan 2022 06:35  Phos  5.2     01-07  Mg     1.9     01-07    TPro  4.3<L>  /  Alb  2.1<L>  /  TBili  0.6  /  DBili  0.4<H>  /  AST  386<H>  /  ALT  155<H>  /  AlkPhos  46  01-07    CAPILLARY BLOOD GLUCOSE      POCT Blood Glucose.: 183 mg/dL (07 Jan 2022 15:52)    PT/INR - ( 07 Jan 2022 06:35 )   PT: 32.10 sec;   INR: 2.82 ratio         PTT - ( 07 Jan 2022 06:35 )  PTT:47.1 sec  ABG - ( 07 Jan 2022 16:14 )  pH, Arterial: 7.28  pH, Blood: x     /  pCO2: 35    /  pO2: 356   / HCO3: 16    / Base Excess: -9.4  /  SaO2: 99.7    RADIOLOGY:  DIET  Diet, NPO:   Except Medications (01-06-22 @ 19:49) 40 YO unvaccinated F has been sick for last 6 months as per  at bedside, loosing weight, hardly ambulates, doesn't bathe herself was up all night feeling worse one episode of diarrhea and  vomiting this am became unresponsive and   called EMS who found patient in asystole, started ACLS. Patient was hypoglycemic, received D50, epi x 5, bicarb and calcium. Went from asystole to PEA to sinus tach to sinus. In ED initial FS 36 COVID positive, HGB of 6.7 hypotensive on 3 pressors (06 Jan 2022 11:08)  PAST MEDICAL & SURGICAL HISTORY:  No pertinent past medical history    No significant past surgical history  ICU Vital Signs Last 24 Hrs  T(C): 37.5 (07 Jan 2022 15:01), Max: 37.7 (07 Jan 2022 13:00)  T(F): 99.5 (07 Jan 2022 15:01), Max: 99.9 (07 Jan 2022 13:00)  HR: 135 (07 Jan 2022 16:18) (91 - 147)  BP: 90/64 (07 Jan 2022 16:00) (62/42 - 156/124)  BP(mean): 73 (07 Jan 2022 16:00) (47 - 133)  ABP: 101/83 (07 Jan 2022 16:00) (68/45 - 108/88)  ABP(mean): 90 (07 Jan 2022 16:00) (57 - 97)  RR: 17 (07 Jan 2022 16:00) (0 - 138)  SpO2: 99% (07 Jan 2022 16:18) (86% - 100%)  Weight (kg): 51 (01-06-22 @ 18:00)  Ideal Body Weight: kG   06 Jan 2022 07:01  -  07 Jan 2022 07:00  --------------------------------------------------------  IN:    dextrose 5% w/ Additives: 1050 mL    DOPamine Infusion: 756 mL    IV PiggyBack: 500 mL    Midazolam: 88 mL    Norepinephrine: 2380 mL    Phenylephrine: 1496 mL    Phenylephrine: 408 mL    Vasopressin: 33.6 mL  Total IN: 6711.6 mL  OUT:    Gastrostomy Tube (mL): 2850 mL    Indwelling Catheter - Urethral (mL): 3925 mL    Voided (mL): 975 mL  Total OUT: 7750 mL  Total NET: -1038.4 mL  7 Jan 2022 07:01  -  07 Jan 2022 16:30  --------------------------------------------------------  IN:    dextrose 5% w/ Additives: 525 mL    DOPamine Infusion: 440 mL    IV PiggyBack: 200 mL    Midazolam: 54 mL    Norepinephrine: 1339 mL    Phenylephrine: 848 mL    Vasopressin: 19.2 mL  Total IN: 3425.2 mL    OUT:    Indwelling Catheter - Urethral (mL): 1425 mL  Total OUT: 1425 mL  Total NET: 2000.2 mL  Mode: Auto Mode: PRVC/ Volume Support  RR (machine): 16  TV (machine): 400  FiO2: 60  PEEP: 5  MAP: 11  PIP: 23  PHYSICAL EXAM:  GENERAL: vented  HEENT: Moist mucous membranes,   OGT in place  ABDOMEN: Soft ,n/d  EXTREMITIES:  no edema  SKIN: No  lesions  IV ACCESS: Left IJ   FEEDING ACCESS: NPO    MEDICATIONS  (STANDING):  calcium carbonate    500 mG (Tums) Chewable 1 Tablet(s) Chew every 6 hours  chlorhexidine 0.12% Liquid 15 milliLiter(s) Oral Mucosa two times a day  chlorhexidine 4% Liquid 1 Application(s) Topical <User Schedule>  cyanocobalamin Injectable 1000 MICROGram(s) IntraMuscular once  DOPamine Infusion 5 MICROgram(s)/kG/Min (6.81 mL/Hr) IV Continuous <Continuous>  folic acid Injectable 1 milliGRAM(s) IV Push once  folic acid Injectable 1 milliGRAM(s) IV Push daily  hydrocortisone sodium succinate Injectable 100 milliGRAM(s) IV Push every 8 hours  levETIRAcetam  IVPB 1000 milliGRAM(s) IV Intermittent every 12 hours  midazolam Infusion 0.02 mG/kG/Hr (0.73 mL/Hr) IV Continuous <Continuous>  midazolam Injectable 2 milliGRAM(s) IV Push once  norepinephrine Infusion 0.05 MICROgram(s)/kG/Min (1.7 mL/Hr) IV Continuous <Continuous>  pantoprazole  Injectable 40 milliGRAM(s) IV Push two times a day  phenylephrine    Infusion 0.1 MICROgram(s)/kG/Min (0.68 mL/Hr) IV Continuous <Continuous>  sodium bicarbonate  Injectable 50 milliEquivalent(s) IV Push once  sodium chloride 0.45% 1000 milliLiter(s) (75 mL/Hr) IV Continuous <Continuous>  thiamine IVPB 300 milliGRAM(s) IV Intermittent every 8 hours  thiamine IVPB 500 milliGRAM(s) IV Intermittent once  vasopressin Infusion 0.04 Unit(s)/Min (2.4 mL/Hr) IV Continuous <Continuous>    MEDICATIONS  (PRN):  Allergies  epinephrine (Headache)    LABS:    01-07    132<L>  |  97<L>  |  24<H>  ----------------------------<  284<H>  4.2   |  17  |  1.8<H>    Ca    6.0<L>      07 Jan 2022 06:35  Phos  5.2     01-07  Mg     1.9     01-07  TPro  4.3<L>  /  Alb  2.1<L>  /  TBili  0.6  /  DBili  0.4<H>  /  AST  386<H>  /  ALT  155<H>  /  AlkPhos  46  01-07                      12.7   31.82 )-----------( 170      ( 07 Jan 2022 06:35 )             37.5   CAPILLARY BLOOD GLUCOSE  POCT Blood Glucose.: 183 mg/dL (07 Jan 2022 15:52)  PT/INR - ( 07 Jan 2022 06:35 )   PT: 32.10 sec;   INR: 2.82 ratio    PTT - ( 07 Jan 2022 06:35 )  PTT:47.1 sec  ABG - ( 07 Jan 2022 16:14 )  pH, Arterial: 7.28  pH, Blood: x     /  pCO2: 35    /  pO2: 356   / HCO3: 16    / Base Excess: -9.4  /  SaO2: 99.7    RADIOLOGY:  DIET  Diet, NPO:   Except Medications (01-06-22 @ 19:49)

## 2022-01-07 NOTE — CONSULT NOTE ADULT - ASSESSMENT
41 year old woman now in the ICU after cardiac arrest. Went from asystole to PEA to sinus tach to sinus. In ED initial FS 36 COVID positive, HGB of 6.7 hypotensive on 3 pressors. REEG showed seizure, burst suppression and epileptiform discharges.  Currently sedated on Versed.       - CT head w/o   - VEEG today   - Give additional Keppra 500 and increase to 1000 mg BID  - Taper down Versed while on VEEG  - Check neurone specific enolase

## 2022-01-07 NOTE — CONSULT NOTE ADULT - ASSESSMENT
# Cardiac arrest  # COVID19 +  # KELSEA / pre-renal/ATN d/t cardiac arrest/ rhabdomyolysis  # Septic shock / lactic acidosis   # Anemia     - on pressors  - intubated/ventilated  - non-oliguric  - creat down trending    Recommendations:  - cont iv hydration / change to isotonic fluids 1/2NS + 75meq NaHCO3, rate to maintain even fluid balance    # Cardiac arrest  # COVID19 +  # KELESA / pre-renal/ATN d/t cardiac arrest/ rhabdomyolysis  # Septic shock / lactic acidosis   # Normocytic anemia   # Hypothyroidism / hypoglycemia / ?AI  # FTT    - on pressors  - intubated/ventilated  - non-oliguric  - creat down trending    Recommendations:  - cont iv hydration / change to isotonic fluids 1/2NS + 75meq NaHCO3, maintain even fluid balance   - CPK level down-trending  - replete K to 4, Mg to 2 as needed  - iv calcium if ionized calcium < 0.8, start calcium carbonate 1 tab q6h  - phos level noted, monitor  - strict i/o  - obtain UA, urine lytes  - abx/covid19 tx per ID, dose vancomycin by level, f/u cultures  - appreciate endocrinology eval, on hydrocortisone, start levothyroxine per endo recs, monitor bg   - +EEG, neuro f/u, cont keppra  - nutrition support Dr. Gannon

## 2022-01-07 NOTE — CONSULT NOTE ADULT - ASSESSMENT
ASSESSMENT   s/p cardiac arrest.   -vented/ sedated on 3 pressors.  -Hypoglycemia   COVID +ve  Septic shock  Sepsis present on admission  Major depression  Severe KELSEA  Severe Anemia(no evidence of active bleed)  Lactic acidosis  hyperphosphatemia  elevated WBC  PLAN  hemodynamically unstable  will re-evaluate in am   poss.TPN  spoke with medical team

## 2022-01-07 NOTE — PROGRESS NOTE ADULT - ATTENDING COMMENTS
Attending Statement: I have personally performed a face to face diagnostic evaluation on this patient. The patient is suffering from:  Cardiopulmonary arrest  Hypoglycemia   COVID +ve  Septic shock  Sepsis present on admission  Major depression  Severe KELSEA  I have reviewed the above note and agree with the history, exam and suggestions for care, except as I have noted in the text. I have amended the treatment plans as necessary.

## 2022-01-07 NOTE — CONSULT NOTE ADULT - CONSULT REASON
AMS after cardiac arrest
post cardiac arrest
Cardiac arrest
KELESA
abnormal TSH , ? adrenal insufficiency
Sepsis
cardiac arrest  NPO

## 2022-01-07 NOTE — PROGRESS NOTE ADULT - SUBJECTIVE AND OBJECTIVE BOX
Over Night Events:  remians critically ill,  On max afsaneh, levo, vaso, dopamine  versed for seizure      ROS:  See HPI    PHYSICAL EXAM    ICU Vital Signs Last 24 Hrs  T(C): 34.7 (07 Jan 2022 07:02), Max: 37.5 (06 Jan 2022 17:42)  T(F): 94.5 (07 Jan 2022 07:02), Max: 99.5 (06 Jan 2022 17:42)  HR: 97 (07 Jan 2022 06:00) (77 - 150)  BP: 93/62 (06 Jan 2022 18:20) (59/35 - 156/124)  BP(mean): 71 (06 Jan 2022 18:20) (71 - 133)  ABP: 107/84 (07 Jan 2022 06:00) (44/44 - 108/88)  ABP(mean): 95 (07 Jan 2022 06:00) (57 - 97)  RR: 16 (07 Jan 2022 07:02) (16 - 138)  SpO2: 98% (07 Jan 2022 05:30) (87% - 100%)      General: Sedated  HEENT: sluggish pupillary reflexes            Lungs: Bilateral BS  Cardiovascular: Regular   Abdomen: Soft, Positive BS  Extremities: No clubbing   Skin: Warm  Neurological: sedated, +ve gag      01-06-22 @ 07:01  -  01-07-22 @ 07:00  --------------------------------------------------------  IN:    dextrose 5% w/ Additives: 1050 mL    DOPamine Infusion: 756 mL    IV PiggyBack: 500 mL    Midazolam: 88 mL    Norepinephrine: 2380 mL    Phenylephrine: 1496 mL    Phenylephrine: 408 mL    Vasopressin: 33.6 mL  Total IN: 6711.6 mL    OUT:    Gastrostomy Tube (mL): 2850 mL    Indwelling Catheter - Urethral (mL): 3925 mL    Voided (mL): 975 mL  Total OUT: 7750 mL    Total NET: -1038.4 mL      01-07-22 @ 07:01  -  01-07-22 @ 08:11  --------------------------------------------------------  IN:  Total IN: 0 mL    OUT:    Indwelling Catheter - Urethral (mL): 350 mL  Total OUT: 350 mL    Total NET: -350 mL          LABS:                          12.7   31.82 )-----------( 170      ( 07 Jan 2022 06:35 )             37.5                                               01-07    132<L>  |  97<L>  |  24<H>  ----------------------------<  284<H>  4.2   |  17  |  1.8<H>    Ca    6.0<L>      07 Jan 2022 06:35  Phos  5.2     01-07  Mg     1.9     01-07    TPro  4.3<L>  /  Alb  2.1<L>  /  TBili  0.6  /  DBili  x   /  AST  386<H>  /  ALT  155<H>  /  AlkPhos  46  01-07      PT/INR - ( 07 Jan 2022 06:35 )   PT: 32.10 sec;   INR: 2.82 ratio         PTT - ( 07 Jan 2022 06:35 )  PTT:47.1 sec                                           CARDIAC MARKERS ( 07 Jan 2022 06:35 )  x     / 0.10 ng/mL / 99547 U/L / x     / x      CARDIAC MARKERS ( 06 Jan 2022 21:49 )  x     / 0.06 ng/mL / x     / x     / x      CARDIAC MARKERS ( 06 Jan 2022 18:00 )  x     / 0.10 ng/mL / [id number] U/L / x     / x      CARDIAC MARKERS ( 06 Jan 2022 08:30 )  x     / 0.05 ng/mL / x     / x     / x                                                LIVER FUNCTIONS - ( 07 Jan 2022 06:35 )  Alb: 2.1 g/dL / Pro: 4.3 g/dL / ALK PHOS: 46 U/L / ALT: 155 U/L / AST: 386 U/L / GGT: x                                                                                               Mode: Auto Mode: PRVC/ Volume Support  RR (machine): 16  TV (machine): 400  FiO2: 60  PEEP: 5  MAP: 9  PIP: 19                                      ABG - ( 07 Jan 2022 03:53 )  pH, Arterial: 7.30  pH, Blood: x     /  pCO2: 38    /  pO2: 104   / HCO3: 19    / Base Excess: -7.1  /  SaO2: 98.1                MEDICATIONS  (STANDING):  chlorhexidine 4% Liquid 1 Application(s) Topical <User Schedule>  cyanocobalamin Injectable 1000 MICROGram(s) IntraMuscular once  dextrose 5% 1000 milliLiter(s) (75 mL/Hr) IV Continuous <Continuous>  DOPamine Infusion 5 MICROgram(s)/kG/Min (6.81 mL/Hr) IV Continuous <Continuous>  folic acid Injectable 1 milliGRAM(s) IV Push once  hydrocortisone sodium succinate Injectable 100 milliGRAM(s) IV Push every 8 hours  levETIRAcetam  IVPB 500 milliGRAM(s) IV Intermittent every 12 hours  midazolam Infusion 0.02 mG/kG/Hr (0.73 mL/Hr) IV Continuous <Continuous>  midazolam Injectable 2 milliGRAM(s) IV Push once  norepinephrine Infusion 0.05 MICROgram(s)/kG/Min (1.7 mL/Hr) IV Continuous <Continuous>  pantoprazole  Injectable 40 milliGRAM(s) IV Push two times a day  phenylephrine    Infusion 0.1 MICROgram(s)/kG/Min (0.68 mL/Hr) IV Continuous <Continuous>  sodium bicarbonate  Injectable 50 milliEquivalent(s) IV Push once  thiamine IVPB 500 milliGRAM(s) IV Intermittent once  vasopressin Infusion 0.04 Unit(s)/Min (2.4 mL/Hr) IV Continuous <Continuous>    MEDICATIONS  (PRN):      Xrays:   B/l inifltrates                                                                                  ECHO

## 2022-01-07 NOTE — CONSULT NOTE ADULT - SUBJECTIVE AND OBJECTIVE BOX
Neurology  Consult Note  01-07-22    Name:  CASS FREEMAN; 41y (1980)    Reason for Admission: COVID 19        Chief Complaint:  HPI:  40 YO unvaccinated F has been sick for last 6 months as per  at bedside, loosing weight, hardly ambulates, doesn't bathe herself was up all night feeling worse one episode of diarrhea and  vomiting this am became unresponsive and   called EMS who found patient in asystole, started ACLS. Patient was hypoglycemic, received D50, epi x 5, bicarb and calcium. Went from asystole to PEA to sinus tach to sinus. In ED initial FS 36 COVID positive, HGB of 6.7 hypotensive on 3 pressors (06 Jan 2022 11:08)      Currently patient intubated and sedated with Versed. REEG showed Background: Burst suppression pattern. Burst consisting of admixed spikes   and sharply contoured theta lasting for a hz      PMHx:   No pertinent past medical history    No pertinent past medical history    Consistent with diffuse cerebral electrophysiological dysfunction.    Secondary to structural cause., Consistent with diffuse cerebral   electrophysiological dysfunction.  Secondary to toxic metabolic cause.    -   potential and also active   generalized seizure secondary to hypoxic   injury  PFHx:     PSuHx:   No significant past surgical history      PSoHx:     Marital Status:  (   )    (   ) Single    (   )    (  )   Occupation:   Lives with: (  ) alone  (  ) children   (  ) spouse   (  ) parents  (  ) other  Recent Travel:     Substance Use (street drugs): (  ) never used  (  ) other:  Tobacco Usage:  (   ) never smoked   (   ) former smoker   (   ) current smoker  (     ) pack year  Alcohol Usage:  Sexual History:         Medications:  MEDICATIONS  (STANDING):  chlorhexidine 0.12% Liquid 15 milliLiter(s) Oral Mucosa two times a day  chlorhexidine 4% Liquid 1 Application(s) Topical <User Schedule>  cyanocobalamin Injectable 1000 MICROGram(s) IntraMuscular once  dextrose 5% 1000 milliLiter(s) (75 mL/Hr) IV Continuous <Continuous>  DOPamine Infusion 5 MICROgram(s)/kG/Min (6.81 mL/Hr) IV Continuous <Continuous>  folic acid Injectable 1 milliGRAM(s) IV Push once  folic acid Injectable 1 milliGRAM(s) IV Push daily  hydrocortisone sodium succinate Injectable 100 milliGRAM(s) IV Push every 8 hours  levETIRAcetam  IVPB 500 milliGRAM(s) IV Intermittent every 12 hours  midazolam Infusion 0.02 mG/kG/Hr (0.73 mL/Hr) IV Continuous <Continuous>  midazolam Injectable 2 milliGRAM(s) IV Push once  norepinephrine Infusion 0.05 MICROgram(s)/kG/Min (1.7 mL/Hr) IV Continuous <Continuous>  pantoprazole  Injectable 40 milliGRAM(s) IV Push two times a day  phenylephrine    Infusion 0.1 MICROgram(s)/kG/Min (0.68 mL/Hr) IV Continuous <Continuous>  sodium bicarbonate  Injectable 50 milliEquivalent(s) IV Push once  thiamine IVPB 300 milliGRAM(s) IV Intermittent every 8 hours  thiamine IVPB 500 milliGRAM(s) IV Intermittent once  vasopressin Infusion 0.04 Unit(s)/Min (2.4 mL/Hr) IV Continuous <Continuous>    MEDICATIONS  (PRN):    Vitals:  T(C): 34.9 (01-07-22 @ 10:00), Max: 37.5 (01-06-22 @ 17:42)  HR: 103 (01-07-22 @ 10:00) (91 - 150)  BP: 87/53 (01-07-22 @ 07:17) (68/44 - 156/124)  RR: 20 (01-07-22 @ 10:00) (0 - 138)  SpO2: 97% (01-07-22 @ 10:00) (86% - 100%)    Labs:                        12.7   31.82 )-----------( 170      ( 07 Jan 2022 06:35 )             37.5     01-07    132<L>  |  97<L>  |  24<H>  ----------------------------<  284<H>  4.2   |  17  |  1.8<H>    Ca    6.0<L>      07 Jan 2022 06:35  Phos  5.2     01-07  Mg     1.9     01-07    TPro  4.3<L>  /  Alb  2.1<L>  /  TBili  0.6  /  DBili  0.4<H>  /  AST  386<H>  /  ALT  155<H>  /  AlkPhos  46  01-07    CAPILLARY BLOOD GLUCOSE      POCT Blood Glucose.: 156 mg/dL (07 Jan 2022 08:32)    LIVER FUNCTIONS - ( 07 Jan 2022 06:35 )  Alb: 2.1 g/dL / Pro: 4.3 g/dL / ALK PHOS: 46 U/L / ALT: 155 U/L / AST: 386 U/L / GGT: x             PT/INR - ( 07 Jan 2022 06:35 )   PT: 32.10 sec;   INR: 2.82 ratio         PTT - ( 07 Jan 2022 06:35 )  PTT:47.1 sec  CSF:                      PHYSICAL EXAMINATION:  - Cranial Nerves II-XII:    Intubated and sedated with Versed   Pupils were 5 mm and equally reactive   Subtle facial grimace to noxious stimuli   No withdraw to pain    REEG:   Abnormal due to the presence of: electrographical event as above,   generalized slowing as above, interictal activity as above  -    Clinical Correlation & Recommendations  Consistent with diffuse cerebral electrophysiological dysfunction.    Secondary to structural cause., Consistent with diffuse cerebral   electrophysiological dysfunction.  Secondary to toxic metabolic cause.    -   potential and also active   generalized seizure secondary to hypoxic   injury

## 2022-01-07 NOTE — PROGRESS NOTE ADULT - ASSESSMENT
IMPRESSION:    Cardiopulmonary arrest(nonshockable)  Hypoglycemia   COVID +ve  Septic shock  Sepsis present on admission  Major depression  Severe KELSEA  Hypokalemia  Severe Malnutrition  Severe Anemia(no evidence of active bleed)  Lactic acidosis  Rhabdomyolysis    PLAN:    CNS: CT head. Continue keppra, check VEEG while on versed, UDS/SDS  Thiamine 300q8, folate, MV, Psych eval, Strat vitamin B12.  Check vitamin b12 levels, check homocysteine    HEENT:  Oral care    PULMONARY:  HOB @ 45 degrees. wean Fio2 to 50%, Check ABGs in 2 hrs  Monitor plateau and driving pressure. no change in vent settings  keep sats 94-97%    CARDIOVASCULAR: Check 2d echo, LE duplex, probnp,  add vasopressin. Continue vasoactive med. target SBP> 100, Continue NAhco3, recheck CK levels    GI: GI prophylaxis                                          Feeding continue oral feeds, nutrition eval.    RENAL:  F/u  lytes.  Correct as needed. accurate I/O, Continue NAHCO3    INFECTIOUS DISEASE: F/u culture, procal, continue abx.    HEMATOLOGICAL:  DVT prophylaxis(SCD for now), vitamin k 1 mg IV    ENDOCRINE:  Follow up FS. HC 100q8. F/u a1c, start levothyroxine    MUSCULOSKELETAL: Bed rest for now    CODE STATUS: FULL CODE    DISPOSITION: Pt requires continued monitoring in the MICU    Poor prognosis

## 2022-01-08 NOTE — GOALS OF CARE CONVERSATION - ADVANCED CARE PLANNING - CONVERSATION DETAILS
I spoke to pt  Mr. Garcia, explained to him the pt current condition, that she has multiorgan failure and likley anoxic brain damage, explaiend to him that her prognosis is futile and I spoke to pt  Mr. Garcia, explained to him the pt current condition, that she has multiorgan failure, on 4 pressors  and likely anoxic brain damage, I explained to him that her prognosis is futile and she wont make it, he understood the situation but he wants to give her chance to see if she will recover and keep her full code for now,

## 2022-01-08 NOTE — PROGRESS NOTE ADULT - SUBJECTIVE AND OBJECTIVE BOX
40 YO unvaccinated F has been sick for last 6 months as per  at bedside, loosing weight, hardly ambulates, doesn't bathe herself was up all night feeling worse one episode of diarrhea and  vomiting this am became unresponsive and   called EMS who found patient in asystole, started ACLS. Patient was hypoglycemic, received D50, epi x 5, bicarb and calcium. Went from asystole to PEA to sinus tach to sinus. In ED initial FS 36 COVID positive, HGB of 6.7 hypotensive on 3 pressors, likely with anoxic brain injury.  Today pt is off sedation, orally intubated on pressure support, comatose, likely due to anoxic brain injury.           ALLERGIES:  epinephrine (Headache)      VITALS:   T(C): 35.4 (08 Jan 2022 07:01), Max: 37.7 (07 Jan 2022 13:00)  T(F): 95.7 (08 Jan 2022 07:01), Max: 99.9 (07 Jan 2022 13:00)  HR: 127 (08 Jan 2022 09:00) (105 - 148)  BP: 90/67 (08 Jan 2022 06:30) (62/42 - 100/74)  BP(mean): 72 (08 Jan 2022 06:30) (47 - 82)  RR: 30 (08 Jan 2022 08:00) (16 - 30)  SpO2: 99% (07 Jan 2022 16:18) (99% - 99%)      PHYSICAL EXAM:  General: comatose, does not respond to painful stimuli   HEENT: sluggish pupillary reflexes            Lungs: decreased BS b/l   Cardiovascular: Regular , S1, S2   Abdomen: Soft, Positive BS, NT   Extremities: No edema on LE   Skin: Warm  Neurological: comatose, does not respond to painful stimuli, no gag, sluggish pupillary reflexes     LABS:                                   10.9   21.82 )-----------( 95       ( 08 Jan 2022 06:42 )             33.8   01-08    130<L>  |  100  |  28<H>  ----------------------------<  260<H>  4.6   |  10<L>  |  1.7<H>    Ca    4.9<LL>      08 Jan 2022 06:42  Phos  6.0     01-08  Mg     1.6     01-08    TPro  3.1<L>  /  Alb  1.4<L>  /  TBili  0.9  /  DBili  x   /  AST  369<H>  /  ALT  150<H>  /  AlkPhos  52  01-08      ABG - ( 07 Jan 2022 03:53 )  pH, Arterial: 7.30  pH, Blood: x     /  pCO2: 38    /  pO2: 104   / HCO3: 19    / Base Excess: -7.1  /  SaO2: 98.1              Lactate, Blood: 5.1 mmol/L *HH* (01-07-22 @ 06:35)  Troponin T, Serum: 0.10 ng/mL *HH* (01-07-22 @ 06:35)  Creatine Kinase, Serum: 75241 U/L *H* (01-07-22 @ 06:35)  Lactate, Blood: 3.8 mmol/L *H* (01-06-22 @ 21:49)  Troponin T, Serum: 0.06 ng/mL *HH* (01-06-22 @ 21:49)  Lactate, Blood: 8.3 mmol/L *HH* (01-06-22 @ 18:00)  Troponin T, Serum: 0.10 ng/mL *HH* (01-06-22 @ 18:00)  Creatine Kinase, Serum: 99797 U/L *H* (01-06-22 @ 18:00)      CARDIAC MARKERS ( 07 Jan 2022 06:35 )  x     / 0.10 ng/mL / 63179 U/L / x     / x      CARDIAC MARKERS ( 06 Jan 2022 21:49 )  x     / 0.06 ng/mL / x     / x     / x      CARDIAC MARKERS ( 06 Jan 2022 18:00 )  x     / 0.10 ng/mL / 71453 U/L / x     / x      CARDIAC MARKERS ( 06 Jan 2022 08:30 )  x     / 0.05 ng/mL / x     / x     / x            Cultures:      RADIOLOGY:  < from: Xray Chest 1 View- PORTABLE-Routine (Xray Chest 1 View- PORTABLE-Routine in AM.) (01.08.22 @ 05:57) >    Impression:  1.  Support lines/tubes, as described.  2.Stable bilateral perihilar opacities and increasing bibasilar   pulmonary opacities/pleural effusions.    < end of copied text >  < from: VA Duplex Lower Ext Vein Scan, Bilat (01.07.22 @ 12:53) >    Impression:    No evidence of deep venous thrombosis or superficial thrombophlebitis in   the bilateral lower extremities.    < end of copied text >  < from: TTE Echo Complete w/ Contrast w/ Doppler (01.07.22 @ 10:18) >  Summary:   1. Technically limited study.   2. Left ventricular ejection fraction, by visual estimation, is 55 to   60%.   3.Mildly enlarged left atrium.   4. No evidence of mitral valve regurgitation.   5. Mild tricuspid regurgitation.    < end of copied text >    MEDICATIONS  (STANDING):  calcium carbonate    500 mG (Tums) Chewable 1 Tablet(s) Chew every 6 hours  chlorhexidine 0.12% Liquid 15 milliLiter(s) Oral Mucosa two times a day  chlorhexidine 4% Liquid 1 Application(s) Topical <User Schedule>  dextrose 5% 1000 milliLiter(s) (80 mL/Hr) IV Continuous <Continuous>  DOPamine Infusion 5 MICROgram(s)/kG/Min (6.81 mL/Hr) IV Continuous <Continuous>  folic acid Injectable 1 milliGRAM(s) IV Push daily  hydrocortisone sodium succinate Injectable 100 milliGRAM(s) IV Push every 8 hours  levETIRAcetam  IVPB 1000 milliGRAM(s) IV Intermittent every 12 hours  levothyroxine Injectable 50 MICROGram(s) IV Push at bedtime  midazolam Infusion 0.02 mG/kG/Hr (0.73 mL/Hr) IV Continuous <Continuous>  norepinephrine Infusion 0.05 MICROgram(s)/kG/Min (1.7 mL/Hr) IV Continuous <Continuous>  pantoprazole  Injectable 40 milliGRAM(s) IV Push two times a day  phenylephrine    Infusion 0.1 MICROgram(s)/kG/Min (0.68 mL/Hr) IV Continuous <Continuous>  thiamine IVPB 300 milliGRAM(s) IV Intermittent every 8 hours  vasopressin Infusion 0.04 Unit(s)/Min (2.4 mL/Hr) IV Continuous <Continuous>

## 2022-01-08 NOTE — PROVIDER CONTACT NOTE (EICU) - ACTION/TREATMENT ORDERED:
Writer on camera and available for assistance.
Consider additional bicarb, increasing bicarb drip, concern for inefficacy of pressors in acidotic state. Discussed with bedside resident.
Uri ORTEZ called and aware of time of death. Spoke with Haily. Case ID # [id number]

## 2022-01-08 NOTE — PROGRESS NOTE ADULT - ASSESSMENT
IMPRESSION:    Cardiopulmonary arrest(nonshockable)  Hypoglycemia   COVID +ve  Septic shock  Sepsis present on admission  Major depression  Severe KELSEA  Hypokalemia  Severe Malnutrition  Severe Anemia(no evidence of active bleed)  Lactic acidosis  Rhabdomyolysis    PLAN:    CNS: follow Veeg hold sedation in no seizure to assess mental status   , UDS/SDS  Thiamine 300q8, folate, MV, Psych eval, Strat vitamin B12.  Check vitamin b12 levels, check homocysteine    HEENT:  Oral care    PULMONARY:  HOB @ 45 degrees. Monitor plateau and driving pressure. no change in vent settings  keep sats 94-97%  lower fio2 to 50  increase rate to 22 to help with the metabolic acidosis     CARDIOVASCULAR: Check 2d echo, LE duplex, probnp,taper pressors to keep map 60   will try to keep is = os will try lasix if bp tolerate give 40   add vasopressin. Continue vasoactive med. target SBP> 100, Continue NAhco3, recheck CK levels    GI: GI prophylaxis                                          Feeding continue oral feeds, nutrition eval.    RENAL:  F/u  lytes.  Correct as needed. accurate I/O, Continue NAHCO3  renal consult   not candidate for CVVH on 3 pressors max   INFECTIOUS DISEASE: F/u culture, procal, continue abx.    HEMATOLOGICAL:  DVT prophylaxis(SCD for now), vitamin k 1 mg IV    ENDOCRINE:  Follow up FS. HC 100q8. F/u a1c,  levothyroxine    MUSCULOSKELETAL: Bed rest for now    CODE STATUS: FULL CODE    DISPOSITION: Pt requires continued monitoring in the MICU    Poor prognosis

## 2022-01-08 NOTE — PROGRESS NOTE ADULT - ASSESSMENT
41 year old woman now in the ICU after cardiac arrest. Went from asystole to PEA to sinus tach to sinus. In ED initial FS 36 COVID positive, HGB of 6.7 hypotensive on 3 pressors. REEG showed seizure, burst suppression and epileptiform discharges. Currently sedated on Versed.     Recommendations:  - CT head w/o   - f/u VEEG   - Taper down Versed while on VEEG  - Check neurone specific enolase 40 yo F s/p cardiac arrest COVID + unvaccinated currently intubated on supportive care w/ burst suppression and epileptiform discharges on REEG suspicious for anoxic brain injury.      Recommendations:  - CT head w/o   - f/u VEEG   - Taper down Versed while on VEEG  - Check neurone specific enolase 42 yo F s/p cardiac arrest COVID + unvaccinated currently intubated on supportive care w/ burst suppression and epileptiform discharges on REEG suspicious for anoxic brain injury currently with minimal brainstem reflexes.      Recommendations:  - CT head w/o   - d/c VEEG  - keep off sedatino for now  - Check neurone specific enolase  - reassess s/p CTH  - continue supportive care for now  - continue keppra at current dose  - discussed with ICU team

## 2022-01-08 NOTE — PROVIDER CONTACT NOTE (EICU) - SITUATION
Was called that the Blood glucose is 21 . Prior the sugar was 55 and was given D50 and the levophed change to 32 mcg in D5w
Uri ORTEZ notified of patient's time of death

## 2022-01-08 NOTE — PROGRESS NOTE ADULT - SUBJECTIVE AND OBJECTIVE BOX
Neurology Consult    Patient is a 41y old  Female who presents with a chief complaint of cardiac arrest (2022 06:27)      HPI:  42 YO unvaccinated F has been sick for last 6 months as per  at bedside, loosing weight, hardly ambulates, doesn't bathe herself was up all night feeling worse one episode of diarrhea and  vomiting this am became unresponsive and   called EMS who found patient in asystole, started ACLS. Patient was hypoglycemic, received D50, epi x 5, bicarb and calcium. Went from asystole to PEA to sinus tach to sinus. In ED initial FS 36 COVID positive, HGB of 6.7 hypotensive on 3 pressors (2022 11:08)      PAST MEDICAL & SURGICAL HISTORY:  No pertinent past medical history    No significant past surgical history        FAMILY HISTORY:      Social History: (-) x 3    Allergies    epinephrine (Headache)    Intolerances        MEDICATIONS  (STANDING):  calcium carbonate    500 mG (Tums) Chewable 1 Tablet(s) Chew every 6 hours  chlorhexidine 0.12% Liquid 15 milliLiter(s) Oral Mucosa two times a day  chlorhexidine 4% Liquid 1 Application(s) Topical <User Schedule>  dextrose 5% 1000 milliLiter(s) (80 mL/Hr) IV Continuous <Continuous>  DOPamine Infusion 5 MICROgram(s)/kG/Min (6.81 mL/Hr) IV Continuous <Continuous>  folic acid Injectable 1 milliGRAM(s) IV Push daily  hydrocortisone sodium succinate Injectable 100 milliGRAM(s) IV Push every 8 hours  levETIRAcetam  IVPB 1000 milliGRAM(s) IV Intermittent every 12 hours  levothyroxine Injectable 50 MICROGram(s) IV Push at bedtime  midazolam Infusion 0.02 mG/kG/Hr (0.73 mL/Hr) IV Continuous <Continuous>  norepinephrine Infusion 0.05 MICROgram(s)/kG/Min (1.7 mL/Hr) IV Continuous <Continuous>  pantoprazole  Injectable 40 milliGRAM(s) IV Push two times a day  phenylephrine    Infusion 0.1 MICROgram(s)/kG/Min (0.68 mL/Hr) IV Continuous <Continuous>  thiamine IVPB 300 milliGRAM(s) IV Intermittent every 8 hours  vasopressin Infusion 0.04 Unit(s)/Min (2.4 mL/Hr) IV Continuous <Continuous>    MEDICATIONS  (PRN):      Review of systems:    Constitutional: as per HPI  Eyes: No eye pain or discharge  ENMT:  No difficulty hearing; No sinus or throat pain  Neck: No pain or stiffness  Respiratory: No cough, wheezing, chills or hemoptysis  Cardiovascular: No chest pain, palpitations, shortness of breath, dyspnea on exertion  Gastrointestinal: No abdominal pain, nausea, vomiting or hematemesis; No diarrhea or constipation.   Genitourinary: No dysuria, frequency, hematuria or incontinence  Neurological: As per HPI  Skin: No rashes or lesions   Endocrine: No heat or cold intolerance; No hair loss  Musculoskeletal: No joint pain or swelling  Psychiatric: No depression, anxiety, mood swings  Heme/Lymph: No easy bruising or bleeding gums    Vital Signs Last 24 Hrs  T(C): 35.4 (2022 07:01), Max: 37.7 (2022 13:00)  T(F): 95.7 (2022 07:01), Max: 99.9 (2022 13:00)  HR: 127 (2022 09:00) (103 - 148)  BP: 90/67 (2022 06:30) (62/42 - 100/74)  BP(mean): 72 (2022 06:30) (47 - 82)  RR: 30 (2022 08:00) (16 - 30)  SpO2: 99% (2022 16:18) (97% - 99%)    Examination:  General:  Appearance is consistent with chronologic age.  No abnormal facies.  Gross skin survey within normal limits.    Cognitive/Language:  The patient is oriented to person, place, time and date.  Recent and remote memory intact.  Fund of knowledge is intact and normal.  Language with normal repetition, comprehension and naming.  Nondysarthric.    Eyes: intact VA, VFF.  EOMI w/o nystagmus, skew or reported double vision.  PERRL.  No ptosis/weakness of eyelid closure.    Face:  Facial sensation normal V1 - 3, no facial asymmetry.    Ears/Nose/Throat:  Hearing grossly intact b/l.  Palate elevates midline.  Tongue and uvula midline.   Motor examination:   Normal tone, bulk and range of motion.  No tenderness, twitching, tremors or involuntary movements.  Formal Muscle Strength Testing: (MRC grade R/L) 5/5 UE; 5/5 LE.  No observable drift.  Reflexes:   2+ b/l pectoralis, biceps, triceps, brachioradialis, patella and Achilles.  Plantar response downgoing b/l.  Jaw jerk, Lynnette, clonus absent.  Sensory examination:   Intact to light touch and pinprick, pain, temperature and proprioception and vibration in all extremities.  Cerebellum:   FTN/HKS intact with normal THUAN in all limbs.  No dysmetria or dysdiadokinesia.  Gait narrow based and normal.    Respiratory:  no audible wheezing or inspiratory stridor.  no use of accessory muscles.   Cardiac: pulse palpable, no audible bruits  Abdomen: supple, no guarding, no TTP    Labs:   CBC Full  -  ( 2022 06:42 )  WBC Count : 21.82 K/uL  RBC Count : 3.85 M/uL  Hemoglobin : 10.9 g/dL  Hematocrit : 33.8 %  Platelet Count - Automated : 95 K/uL  Mean Cell Volume : 87.8 fL  Mean Cell Hemoglobin : 28.3 pg  Mean Cell Hemoglobin Concentration : 32.2 g/dL  Auto Neutrophil # : 18.41 K/uL  Auto Lymphocyte # : 2.46 K/uL  Auto Monocyte # : 0.67 K/uL  Auto Eosinophil # : 0.01 K/uL  Auto Basophil # : 0.03 K/uL  Auto Neutrophil % : 84.4 %  Auto Lymphocyte % : 11.3 %  Auto Monocyte % : 3.1 %  Auto Eosinophil % : 0.0 %  Auto Basophil % : 0.1 %        130<L>  |  100  |  28<H>  ----------------------------<  260<H>  4.6   |  10<L>  |  1.7<H>    Ca    4.9<LL>      2022 06:42  Phos  6.0       Mg     1.6         TPro  3.1<L>  /  Alb  1.4<L>  /  TBili  0.9  /  DBili  x   /  AST  369<H>  /  ALT  150<H>  /  AlkPhos  52  08    LIVER FUNCTIONS - ( 2022 06:42 )  Alb: 1.4 g/dL / Pro: 3.1 g/dL / ALK PHOS: 52 U/L / ALT: 150 U/L / AST: 369 U/L / GGT: x           PT/INR - ( 2022 06:35 )   PT: 32.10 sec;   INR: 2.82 ratio         PTT - ( 2022 06:35 )  PTT:47.1 sec  Urinalysis Basic - ( 2022 14:49 )    Color: Yellow / Appearance: Clear / S.025 / pH: x  Gluc: x / Ketone: Negative  / Bili: Negative / Urobili: 0.2 mg/dL   Blood: x / Protein: 100 mg/dL / Nitrite: Negative   Leuk Esterase: Negative / RBC: 26-50 /HPF / WBC 6-10 /HPF   Sq Epi: x / Non Sq Epi: Few /HPF / Bacteria: Moderate     Neurology Consult    Patient is a 41y old  Female who presents with a chief complaint of cardiac arrest (2022 06:27)      HPI:  40 YO unvaccinated F has been sick for last 6 months as per  at bedside, loosing weight, hardly ambulates, doesn't bathe herself was up all night feeling worse one episode of diarrhea and  vomiting this am became unresponsive and   called EMS who found patient in asystole, started ACLS. Patient was hypoglycemic, received D50, epi x 5, bicarb and calcium. Went from asystole to PEA to sinus tach to sinus. In ED initial FS 36 COVID positive, HGB of 6.7 hypotensive on 3 pressors (2022 11:08)      PAST MEDICAL & SURGICAL HISTORY:  No pertinent past medical history    No significant past surgical history        FAMILY HISTORY:      Social History: (-) x 3    Allergies    epinephrine (Headache)    Intolerances        MEDICATIONS  (STANDING):  calcium carbonate    500 mG (Tums) Chewable 1 Tablet(s) Chew every 6 hours  chlorhexidine 0.12% Liquid 15 milliLiter(s) Oral Mucosa two times a day  chlorhexidine 4% Liquid 1 Application(s) Topical <User Schedule>  dextrose 5% 1000 milliLiter(s) (80 mL/Hr) IV Continuous <Continuous>  DOPamine Infusion 5 MICROgram(s)/kG/Min (6.81 mL/Hr) IV Continuous <Continuous>  folic acid Injectable 1 milliGRAM(s) IV Push daily  hydrocortisone sodium succinate Injectable 100 milliGRAM(s) IV Push every 8 hours  levETIRAcetam  IVPB 1000 milliGRAM(s) IV Intermittent every 12 hours  levothyroxine Injectable 50 MICROGram(s) IV Push at bedtime  midazolam Infusion 0.02 mG/kG/Hr (0.73 mL/Hr) IV Continuous <Continuous>  norepinephrine Infusion 0.05 MICROgram(s)/kG/Min (1.7 mL/Hr) IV Continuous <Continuous>  pantoprazole  Injectable 40 milliGRAM(s) IV Push two times a day  phenylephrine    Infusion 0.1 MICROgram(s)/kG/Min (0.68 mL/Hr) IV Continuous <Continuous>  thiamine IVPB 300 milliGRAM(s) IV Intermittent every 8 hours  vasopressin Infusion 0.04 Unit(s)/Min (2.4 mL/Hr) IV Continuous <Continuous>    MEDICATIONS  (PRN):      Review of systems:    Constitutional: as per HPI  Eyes: No eye pain or discharge  ENMT:  No difficulty hearing; No sinus or throat pain  Neck: No pain or stiffness  Respiratory: No cough, wheezing, chills or hemoptysis  Cardiovascular: No chest pain, palpitations, shortness of breath, dyspnea on exertion  Gastrointestinal: No abdominal pain, nausea, vomiting or hematemesis; No diarrhea or constipation.   Genitourinary: No dysuria, frequency, hematuria or incontinence  Neurological: As per HPI  Skin: No rashes or lesions   Endocrine: No heat or cold intolerance; No hair loss  Musculoskeletal: No joint pain or swelling  Psychiatric: No depression, anxiety, mood swings  Heme/Lymph: No easy bruising or bleeding gums    Vital Signs Last 24 Hrs  T(C): 35.4 (2022 07:01), Max: 37.7 (2022 13:00)  T(F): 95.7 (2022 07:01), Max: 99.9 (2022 13:00)  HR: 127 (2022 09:00) (103 - 148)  BP: 90/67 (2022 06:30) (62/42 - 100/74)  BP(mean): 72 (2022 06:30) (47 - 82)  RR: 30 (2022 08:00) (16 - 30)  SpO2: 99% (2022 16:18) (97% - 99%)    Examination:  intubated off sedation.  AC 22 RR 36.  unresponsive.    CN: pupils mid-dilated 3 mm fixed, no versize gaze, no BTVT, (-) Doll's, (-) corneals  no spontaneous movement  no w/d to pain   no abnormal movements    Labs:   CBC Full  -  ( 2022 06:42 )  WBC Count : 21.82 K/uL  RBC Count : 3.85 M/uL  Hemoglobin : 10.9 g/dL  Hematocrit : 33.8 %  Platelet Count - Automated : 95 K/uL  Mean Cell Volume : 87.8 fL  Mean Cell Hemoglobin : 28.3 pg  Mean Cell Hemoglobin Concentration : 32.2 g/dL  Auto Neutrophil # : 18.41 K/uL  Auto Lymphocyte # : 2.46 K/uL  Auto Monocyte # : 0.67 K/uL  Auto Eosinophil # : 0.01 K/uL  Auto Basophil # : 0.03 K/uL  Auto Neutrophil % : 84.4 %  Auto Lymphocyte % : 11.3 %  Auto Monocyte % : 3.1 %  Auto Eosinophil % : 0.0 %  Auto Basophil % : 0.1 %        130<L>  |  100  |  28<H>  ----------------------------<  260<H>  4.6   |  10<L>  |  1.7<H>    Ca    4.9<LL>      2022 06:42  Phos  6.0       Mg     1.6         TPro  3.1<L>  /  Alb  1.4<L>  /  TBili  0.9  /  DBili  x   /  AST  369<H>  /  ALT  150<H>  /  AlkPhos  52      LIVER FUNCTIONS - ( 2022 06:42 )  Alb: 1.4 g/dL / Pro: 3.1 g/dL / ALK PHOS: 52 U/L / ALT: 150 U/L / AST: 369 U/L / GGT: x           PT/INR - ( 2022 06:35 )   PT: 32.10 sec;   INR: 2.82 ratio         PTT - ( 2022 06:35 )  PTT:47.1 sec  Urinalysis Basic - ( 2022 14:49 )    Color: Yellow / Appearance: Clear / S.025 / pH: x  Gluc: x / Ketone: Negative  / Bili: Negative / Urobili: 0.2 mg/dL   Blood: x / Protein: 100 mg/dL / Nitrite: Negative   Leuk Esterase: Negative / RBC: 26-50 /HPF / WBC 6-10 /HPF   Sq Epi: x / Non Sq Epi: Few /HPF / Bacteria: Moderate    VEEG (prelim): improvement in burst suppression but overall generalized slowing, no epileptiform

## 2022-01-08 NOTE — PROGRESS NOTE ADULT - ASSESSMENT
Pt w/ KELSEA, albeit overall better, severe acidosis, profound shock despite maximum-dose of 4 pressors, respiratory failure, anoxic brain injury w/ minimal brainstem reflexes.  Pt's demise is imminent  No further renal intervention warranted

## 2022-01-08 NOTE — PROVIDER CONTACT NOTE (EICU) - RECOMMENDATIONS
D50w - 100 ml  consider D10   discussed with the intesivist at the bedside
Hypoglycemia noted.
Sherita called

## 2022-01-08 NOTE — DISCHARGE NOTE FOR THE EXPIRED PATIENT - HOSPITAL COURSE
40 YO unvaccinated F has been sick for last 6 months as per  at bedside, loosing weight, hardly ambulates, doesn't bathe herself, refusing to get medical evaluation, was brought to ER after having diarrhea vomiting and became unresponsive    called EMS who found patient in asystole, started ACLS. Patient was hypoglycemic, received D50, epi x 5, bicarb and calcium. Went from asystole to PEA to sinus tach to sinus. In ED initial FS 36 COVID positive, HGB of 6.7 hypotensive on 3 pressors.    pt admitted to ICU and required 4 pressors for pressure support, pt had multiorgan failure and had didnt make any mental response despite being off sedation, pt condition continue to deteriorate and was found pulseless at 8:37pm, code blue called and   ACLS protocol iniated, code lasted for 20 min, rythum remained asystole, pt was prnounced  at 8:57pm,  was at the bedside at informed.

## 2022-01-08 NOTE — PROGRESS NOTE ADULT - ASSESSMENT
40 YO unvaccinated F has been sick for last 6 months as per  at bedside, loosing weight, hardly ambulates, doesn't bathe herself was up all night feeling worse one episode of diarrhea and  vomiting this am became unresponsive and   called EMS who found patient in asystole, s/p cardiac arrest. Now intubated sedated on 3 pressors.      # Cardiopulmonary arrest / anoxic brain injury Septic shock/ Lactic acidosis / COVID- 19 viral illness   - c/w pressure support ventilation   - on telemetry monitoring and 3 pressors, keep MAP above 65   - c/w ABx , f/u Cx   - IV hydration with IV Bicarb   - monitor intake and output, BUN/cr and bicarb level ( increase bicarb drip today)  - pt is off sedation now, has no Gag, likely has anoxic brain injury   - consulted by neurology, follow up recommendations   - pt needs HCT when stable   - f/u VEEG report, c/w Keppra   - c/w Isolation, supportive care  - on stress dose of steroids  - consulted by ID, Endo, pulmonary is following  - started on Levothyroxine   - c/w close monitoring in MICU       # KELSEA / Rhabdomyolysis  - IV hydration ( increase bicarb drip)  - monitor BUN/cr, bicarb, CK levle  and urine output   - keep MAP above 65     # Severe malnutrition/ h/o Depression   - Thiamine 300q8, folate, MV,  vitamin B12.  - nutritional support   - start OG tube feedings     # c/w current medical managment as per ICU team, monitor and replete electrolytes, DVT prophylaxis     #  prognosis is grave, pt might require  terminal extubation in the nearest future, consider palliative care consult in 24 -48 hours if anoxic brain injury confirmed by neurology.     Case d/w ICU team

## 2022-01-08 NOTE — PROVIDER CONTACT NOTE (EICU) - ASSESSMENT
Patient 
Pt intubated, on 4 pressors. Review of chart shows profound metabolic acidosis.
Upon video assessment, pt undergoing ACLS with multiple members of team at bedside.

## 2022-01-08 NOTE — PROGRESS NOTE ADULT - SUBJECTIVE AND OBJECTIVE BOX
Patient is a 41y old  Female who presents with a chief complaint of cardiac arrest (2022 16:30)      Over Night Events:  Patient seen and examined.   still on vent on max 3 pressors   no acute events over night   on VEEG       ROS:  See HPI    PHYSICAL EXAM    ICU Vital Signs Last 24 Hrs  T(C): 35.8 (2022 03:00), Max: 37.7 (2022 13:00)  T(F): 96.4 (2022 03:00), Max: 99.9 (2022 13:00)  HR: 130 (2022 02:30) (91 - 148)  BP: 84/57 (2022 00:30) (62/42 - 100/74)  BP(mean): 66 (2022 00:30) (47 - 82)  ABP: 92/82 (2022 02:30) (91/68 - 108/87)  ABP(mean): 86 (2022 02:30) (77 - 95)  RR: 22 (2022 05:00) (0 - 24)  SpO2: 99% (2022 16:18) (86% - 100%)      General: ill looking   HEENT:    et tube            Lymph Nodes: NO cervical LN   Lungs: Bilateral BS  Cardiovascular: Regular   Abdomen: Soft, Positive BS  Extremities: No clubbing   Skin: warm   Neurological:  positive gag   Musculoskeletal: move all ext     I&O's Detail    2022 07:01  -  2022 07:00  --------------------------------------------------------  IN:    dextrose 5% w/ Additives: 1050 mL    DOPamine Infusion: 756 mL    IV PiggyBack: 500 mL    Midazolam: 88 mL    Norepinephrine: 2380 mL    Phenylephrine: 1496 mL    Phenylephrine: 408 mL    Vasopressin: 33.6 mL  Total IN: 6711.6 mL    OUT:    Gastrostomy Tube (mL): 2850 mL    Indwelling Catheter - Urethral (mL): 3925 mL    Voided (mL): 975 mL  Total OUT: 7750 mL    Total NET: -1038.4 mL      2022 07:01  -  2022 06:28  --------------------------------------------------------  IN:    dextrose 5% w/ Additives: 525 mL    DOPamine Infusion: 943 mL    IV PiggyBack: 300 mL    Midazolam: 98 mL    Norepinephrine: 2981 mL    Phenylephrine: 1592 mL    sodium chloride 0.45% w/ Additives: 300 mL    Vasopressin: 38.4 mL  Total IN: 6777.4 mL    OUT:    Indwelling Catheter - Urethral (mL): 1710 mL  Total OUT: 1710 mL    Total NET: 5067.4 mL          LABS:                          12.7   31.82 )-----------( 170      ( 2022 06:35 )             37.5         2022 06:35    132    |  97     |  24     ----------------------------<  284    4.2     |  17     |  1.8      Ca    6.0        2022 06:35  Phos  5.2       2022 06:35  Mg     1.9       2022 06:35    TPro  4.3    /  Alb  2.1    /  TBili  0.6    /  DBili  0.4    /  AST  386    /  ALT  155    /  AlkPhos  46     2022 06:35  Amylase x     lipase x                                                 PT/INR - ( 2022 06:35 )   PT: 32.10 sec;   INR: 2.82 ratio         PTT - ( 2022 06:35 )  PTT:47.1 sec                                       Urinalysis Basic - ( 2022 14:49 )    Color: Yellow / Appearance: Clear / S.025 / pH: x  Gluc: x / Ketone: Negative  / Bili: Negative / Urobili: 0.2 mg/dL   Blood: x / Protein: 100 mg/dL / Nitrite: Negative   Leuk Esterase: Negative / RBC: 26-50 /HPF / WBC 6-10 /HPF   Sq Epi: x / Non Sq Epi: Few /HPF / Bacteria: Moderate        Lactate, Blood: 5.1 mmol/L (22 @ 06:35)  Lactate, Blood: 3.8 mmol/L (01-06-22 @ 21:49)  Lactate, Blood: 8.3 mmol/L (22 @ 18:00)    CARDIAC MARKERS ( 2022 15:56 )  x     / 0.16 ng/mL / 9125 U/L / x     / x      CARDIAC MARKERS ( 2022 06:35 )  x     / 0.10 ng/mL / 85972 U/L / x     / x      CARDIAC MARKERS ( 2022 21:49 )  x     / 0.06 ng/mL / x     / x     / x      CARDIAC MARKERS ( 2022 18:00 )  x     / 0.10 ng/mL / 99487 U/L / x     / x      CARDIAC MARKERS ( 2022 08:30 )  x     / 0.05 ng/mL / x     / x     / x                                                                                                         Mode: Auto Mode: PRVC/ Volume Support  RR (machine): 16  TV (machine): 400  FiO2: 60  PEEP: 5  MAP: 12  PIP: 22                                      ABG - ( 2022 03:50 )  pH, Arterial: 7.17  pH, Blood: x     /  pCO2: 23    /  pO2: 237   / HCO3: 8     / Base Excess: -18.3 /  SaO2: 99.9                MEDICATIONS  (STANDING):  calcium carbonate    500 mG (Tums) Chewable 1 Tablet(s) Chew every 6 hours  chlorhexidine 0.12% Liquid 15 milliLiter(s) Oral Mucosa two times a day  chlorhexidine 4% Liquid 1 Application(s) Topical <User Schedule>  dextrose 50% Injectable 50 milliLiter(s) IV Push once  DOPamine Infusion 5 MICROgram(s)/kG/Min (6.81 mL/Hr) IV Continuous <Continuous>  folic acid Injectable 1 milliGRAM(s) IV Push daily  folic acid Injectable 1 milliGRAM(s) IV Push once  hydrocortisone sodium succinate Injectable 100 milliGRAM(s) IV Push every 8 hours  levETIRAcetam  IVPB 1000 milliGRAM(s) IV Intermittent every 12 hours  levothyroxine Injectable 50 MICROGram(s) IV Push at bedtime  midazolam Infusion 0.02 mG/kG/Hr (0.73 mL/Hr) IV Continuous <Continuous>  midazolam Injectable 2 milliGRAM(s) IV Push once  norepinephrine Infusion 0.05 MICROgram(s)/kG/Min (1.7 mL/Hr) IV Continuous <Continuous>  pantoprazole  Injectable 40 milliGRAM(s) IV Push two times a day  phenylephrine    Infusion 0.1 MICROgram(s)/kG/Min (0.68 mL/Hr) IV Continuous <Continuous>  sodium bicarbonate  Injectable 50 milliEquivalent(s) IV Push once  sodium chloride 0.45% 1000 milliLiter(s) (100 mL/Hr) IV Continuous <Continuous>  thiamine IVPB 300 milliGRAM(s) IV Intermittent every 8 hours  thiamine IVPB 500 milliGRAM(s) IV Intermittent once  vasopressin Infusion 0.04 Unit(s)/Min (2.4 mL/Hr) IV Continuous <Continuous>    MEDICATIONS  (PRN):          Xrays:  TLC:  OG:  ET tube:                                                                                    worsen b/l effusion    ECHO:  [facility] ICU:

## 2022-01-08 NOTE — PROGRESS NOTE ADULT - SUBJECTIVE AND OBJECTIVE BOX
IGOR FOLLOW UP NOTE  --------------------------------------------------------------------------------  Chief Complaint:    24 hour events/subjective:  Events over last 24 hrs noted.  Pt currently terminally ill        PAST HISTORY  --------------------------------------------------------------------------------  No significant changes to PMH, PSH, FHx, SHx, unless otherwise noted    ALLERGIES & MEDICATIONS  --------------------------------------------------------------------------------  Allergies    epinephrine (Headache)    Intolerances      Standing Inpatient Medications  calcium carbonate    500 mG (Tums) Chewable 1 Tablet(s) Chew every 6 hours  chlorhexidine 0.12% Liquid 15 milliLiter(s) Oral Mucosa two times a day  chlorhexidine 4% Liquid 1 Application(s) Topical <User Schedule>  dextrose 10%. 1000 milliLiter(s) IV Continuous <Continuous>  DOPamine Infusion 5 MICROgram(s)/kG/Min IV Continuous <Continuous>  folic acid Injectable 1 milliGRAM(s) IV Push daily  hydrocortisone sodium succinate Injectable 100 milliGRAM(s) IV Push every 8 hours  levETIRAcetam  IVPB 1000 milliGRAM(s) IV Intermittent every 12 hours  levothyroxine Injectable 50 MICROGram(s) IV Push at bedtime  midazolam Infusion 0.02 mG/kG/Hr IV Continuous <Continuous>  norepinephrine Infusion 0.05 MICROgram(s)/kG/Min IV Continuous <Continuous>  pantoprazole  Injectable 40 milliGRAM(s) IV Push two times a day  phenylephrine    Infusion 0.1 MICROgram(s)/kG/Min IV Continuous <Continuous>  sodium bicarbonate  Injectable 50 milliEquivalent(s) IV Push every 5 minutes  sodium chloride 0.45% 1000 milliLiter(s) IV Continuous <Continuous>  thiamine IVPB 300 milliGRAM(s) IV Intermittent every 8 hours  vasopressin Infusion 0.04 Unit(s)/Min IV Continuous <Continuous>    PRN Inpatient Medications      REVIEW OF SYSTEMS  --------------------------------------------------------------------------------  Gen: No weight changes, fatigue, fevers/chills, weakness  Skin: No rashes  Head/Eyes/Ears/Mouth: No headache; Normal hearing; Normal vision w/o blurriness; No sinus pain/discomfort, sore throat  Respiratory: No dyspnea, cough, wheezing, hemoptysis  CV: No chest pain, PND, orthopnea  GI: No abdominal pain, diarrhea, constipation, nausea, vomiting, melena, hematochezia  : No increased frequency, dysuria, hematuria, nocturia  MSK: No joint pain/swelling; no back pain; no edema  Neuro: No dizziness/lightheadedness, weakness, seizures, numbness, tingling  Heme: No easy bruising or bleeding  Endo: No heat/cold intolerance  Psych: No significant nervousness, anxiety, stress, depression    All other systems were reviewed and are negative, except as noted.    VITALS/PHYSICAL EXAM  --------------------------------------------------------------------------------  T(C): 35.7 (01-08-22 @ 19:00), Max: 36 (01-07-22 @ 23:01)  HR: 130 (01-08-22 @ 18:00) (105 - 139)  BP: 90/67 (01-08-22 @ 06:30) (84/57 - 95/65)  RR: 31 (01-08-22 @ 19:00) (16 - 34)  SpO2: --  Wt(kg): --        01-07-22 @ 07:01  -  01-08-22 @ 07:00  --------------------------------------------------------  IN: 64208.4 mL / OUT: 1710 mL / NET: 9118.4 mL    01-08-22 @ 07:01  -  01-08-22 @ 19:37  --------------------------------------------------------  IN: 5933 mL / OUT: 235 mL / NET: 5698 mL      Physical Exam:  	Gen: NAD, well-appearing  	HEENT: PERRL, supple neck, clear oropharynx  	Pulm: CTA B/L  	CV: RRR, S1S2; no rub  	Back: No spinal or CVA tenderness; no sacral edema  	Abd: +BS, soft, nontender/nondistended  	: No suprapubic tenderness  	UE: Warm, FROM, no clubbing, intact strength; no edema; no asterixis  	LE: Warm, FROM, no clubbing, intact strength; no edema  	Neuro: No focal deficits, intact gait  	Psych: Normal affect and mood  	Skin: Warm, without rashes  	Vascular access:    LABS/STUDIES  --------------------------------------------------------------------------------              10.9   21.82 >-----------<  95       [01-08-22 @ 06:42]              33.8     126  |  97  |  28  ----------------------------<  319      [01-08-22 @ 14:23]  4.6   |  6   |  1.7        Ca     4.9     [01-08-22 @ 14:23]      Mg     1.6     [01-08-22 @ 06:42]      Phos  6.0     [01-08-22 @ 06:42]    TPro  3.1  /  Alb  1.4  /  TBili  1.0  /  DBili  x   /  AST  765  /  ALT  282  /  AlkPhos  62  [01-08-22 @ 14:23]    PT/INR: PT 32.10, INR 2.82       [01-07-22 @ 06:35]  PTT: 47.1       [01-07-22 @ 06:35]    Troponin 0.16      [01-07-22 @ 15:56]  CK 6150      [01-08-22 @ 06:42]    Creatinine Trend:  SCr 1.7 [01-08 @ 14:23]  SCr 1.7 [01-08 @ 06:42]  SCr 1.8 [01-07 @ 06:35]  SCr 2.2 [01-06 @ 21:49]  SCr 2.4 [01-06 @ 18:00]    Urinalysis - [01-07-22 @ 14:49]      Color Yellow / Appearance Clear / SG 1.025 / pH 5.5      Gluc 250 / Ketone Negative  / Bili Negative / Urobili 0.2       Blood Large / Protein 100 / Leuk Est Negative / Nitrite Negative      RBC 26-50 / WBC 6-10 / Hyaline  / Gran 1-2 / Sq Epi  / Non Sq Epi Few / Bacteria Moderate      TSH 3.49      [01-07-22 @ 06:35]  Lipid: chol 48, TG 99, HDL 20, LDL --      [01-07-22 @ 06:35]    HIV Nonreact      [01-06-22 @ 08:30]

## 2022-01-08 NOTE — DISCHARGE NOTE FOR THE EXPIRED PATIENT - OTHER SIGNIFICANT FINDINGS
cause of death mutiorgan failure s/p cardiac arrest, covid positive  cause of death mutiorgan failure s/p cardiac arrest, covid positive   spoke to Haily RAYGOZA, Case ID # [id number]

## 2022-01-09 LAB — CA-I BLD-SCNC: 0.76 MMOL/L — LOW (ref 1.15–1.33)

## 2022-01-10 LAB — THYROPEROXIDASE AB SERPL-ACNC: 1545 IU/ML — HIGH

## 2022-01-12 LAB
METHYLMALONATE SERPL-SCNC: 738 NMOL/L — HIGH (ref 0–378)
ZINC SERPL-MCNC: 36 UG/DL — LOW (ref 44–115)

## 2022-01-13 LAB — DRUG SCREEN, SERUM: SIGNIFICANT CHANGE UP

## 2022-01-14 LAB
ADRENAL CORTEX AB SER-ACNC: POSITIVE — SIGNIFICANT CHANGE UP
VIT B1 SERPL-MCNC: 214.4 NMOL/L — HIGH (ref 66.5–200)

## 2022-01-19 DIAGNOSIS — R56.9 UNSPECIFIED CONVULSIONS: ICD-10-CM

## 2022-01-19 DIAGNOSIS — E83.39 OTHER DISORDERS OF PHOSPHORUS METABOLISM: ICD-10-CM

## 2022-01-19 DIAGNOSIS — E43 UNSPECIFIED SEVERE PROTEIN-CALORIE MALNUTRITION: ICD-10-CM

## 2022-01-19 DIAGNOSIS — A41.9 SEPSIS, UNSPECIFIED ORGANISM: ICD-10-CM

## 2022-01-19 DIAGNOSIS — E27.40 UNSPECIFIED ADRENOCORTICAL INSUFFICIENCY: ICD-10-CM

## 2022-01-19 DIAGNOSIS — R65.21 SEVERE SEPSIS WITH SEPTIC SHOCK: ICD-10-CM

## 2022-01-19 DIAGNOSIS — E16.2 HYPOGLYCEMIA, UNSPECIFIED: ICD-10-CM

## 2022-01-19 DIAGNOSIS — E87.2 ACIDOSIS: ICD-10-CM

## 2022-01-19 DIAGNOSIS — F32.9 MAJOR DEPRESSIVE DISORDER, SINGLE EPISODE, UNSPECIFIED: ICD-10-CM

## 2022-01-19 DIAGNOSIS — K92.2 GASTROINTESTINAL HEMORRHAGE, UNSPECIFIED: ICD-10-CM

## 2022-01-19 DIAGNOSIS — U07.1 COVID-19: ICD-10-CM

## 2022-01-19 DIAGNOSIS — R57.0 CARDIOGENIC SHOCK: ICD-10-CM

## 2022-01-19 DIAGNOSIS — N17.9 ACUTE KIDNEY FAILURE, UNSPECIFIED: ICD-10-CM

## 2022-01-19 DIAGNOSIS — E87.6 HYPOKALEMIA: ICD-10-CM

## 2022-01-19 DIAGNOSIS — D64.9 ANEMIA, UNSPECIFIED: ICD-10-CM

## 2022-01-19 DIAGNOSIS — M62.82 RHABDOMYOLYSIS: ICD-10-CM

## 2022-01-19 DIAGNOSIS — J96.00 ACUTE RESPIRATORY FAILURE, UNSPECIFIED WHETHER WITH HYPOXIA OR HYPERCAPNIA: ICD-10-CM

## 2022-01-19 DIAGNOSIS — E03.9 HYPOTHYROIDISM, UNSPECIFIED: ICD-10-CM

## 2022-01-19 DIAGNOSIS — R74.01 ELEVATION OF LEVELS OF LIVER TRANSAMINASE LEVELS: ICD-10-CM

## 2022-01-19 DIAGNOSIS — G93.1 ANOXIC BRAIN DAMAGE, NOT ELSEWHERE CLASSIFIED: ICD-10-CM

## 2022-01-19 DIAGNOSIS — R62.7 ADULT FAILURE TO THRIVE: ICD-10-CM

## 2022-01-19 DIAGNOSIS — Z86.74 PERSONAL HISTORY OF SUDDEN CARDIAC ARREST: ICD-10-CM

## 2022-01-20 LAB
MANUAL DIF COMMENT BLD-IMP: SIGNIFICANT CHANGE UP

## 2022-01-21 LAB
CORTICOSTEROID BINDING GLOBULIN RESULT: 1.6 MG/DL — LOW
CORTIS F/TOTAL MFR SERPL: <3.5 % — SIGNIFICANT CHANGE UP
CORTIS SERPL-MCNC: <1 UG/DL — LOW
CORTISOL, FREE RESULT: <0.03 UG/DL — LOW

## 2023-05-03 NOTE — ED ADULT NURSE NOTE - MODE OF DISCHARGE
Ambulatory Cimzia Pregnancy And Lactation Text: This medication crosses the placenta but can be considered safe in certain situations. Cimzia may be excreted in breast milk.

## 2024-11-04 NOTE — ED ADULT NURSE NOTE - NSHOSCREENINGQ1_ED_ALL_ED
Health Maintenance       Diabetes Eye Exam (Yearly)  Never done    Pneumococcal Vaccine 0-64 (2 of 2 - PCV)  Overdue since 12/29/2022    Influenza Vaccine (1)  Overdue since 9/1/2024    COVID-19 Vaccine (4 - 2024-25 season)  Overdue since 9/1/2024    Diabetes Foot Exam (Yearly)  Due soon on 11/28/2024           Following review of the above:  Patient wishes to discuss with clinician: Diabetes Foot Exam, COVID-19, Influenza, and Pneumococcal    Note: Refer to final orders and clinician documentation.       No